# Patient Record
Sex: MALE | Race: WHITE | NOT HISPANIC OR LATINO | Employment: OTHER | URBAN - METROPOLITAN AREA
[De-identification: names, ages, dates, MRNs, and addresses within clinical notes are randomized per-mention and may not be internally consistent; named-entity substitution may affect disease eponyms.]

---

## 2017-10-06 ENCOUNTER — HOSPITAL ENCOUNTER (EMERGENCY)
Facility: MEDICAL CENTER | Age: 62
End: 2017-10-06
Attending: EMERGENCY MEDICINE
Payer: MEDICAID

## 2017-10-06 VITALS
OXYGEN SATURATION: 95 % | WEIGHT: 191.36 LBS | SYSTOLIC BLOOD PRESSURE: 135 MMHG | HEART RATE: 86 BPM | BODY MASS INDEX: 27.4 KG/M2 | HEIGHT: 70 IN | TEMPERATURE: 98.9 F | RESPIRATION RATE: 14 BRPM | DIASTOLIC BLOOD PRESSURE: 86 MMHG

## 2017-10-06 DIAGNOSIS — G89.29 CHRONIC DENTAL PAIN: ICD-10-CM

## 2017-10-06 DIAGNOSIS — K08.9 CHRONIC DENTAL PAIN: ICD-10-CM

## 2017-10-06 PROCEDURE — 99283 EMERGENCY DEPT VISIT LOW MDM: CPT

## 2017-10-06 RX ORDER — PENICILLIN V POTASSIUM 500 MG/1
500 TABLET ORAL 4 TIMES DAILY
Qty: 40 TAB | Refills: 0 | Status: SHIPPED | OUTPATIENT
Start: 2017-10-06 | End: 2017-10-16

## 2017-10-06 RX ORDER — IBUPROFEN 600 MG/1
600 TABLET ORAL EVERY 6 HOURS PRN
Qty: 30 TAB | Refills: 0 | Status: SHIPPED | OUTPATIENT
Start: 2017-10-06 | End: 2018-11-27

## 2017-10-06 ASSESSMENT — ENCOUNTER SYMPTOMS: FEVER: 0

## 2017-10-06 ASSESSMENT — PAIN SCALES - GENERAL
PAINLEVEL_OUTOF10: 10
PAINLEVEL_OUTOF10: 10

## 2017-10-06 NOTE — ED PROVIDER NOTES
"ED Provider Note  ED Provider Note          CHIEF COMPLAINT  Chief Complaint   Patient presents with   • Dental Pain     broken teeth to upper front teeth,  has dentist appt on 10/19, but having too much pain and used last of pain medication last night.       HPI  Polo Johnson is a 62 y.o. male who presents to the Emergency Department For concern of recurrent dental problems. She said he is in the process of getting all his teeth extracted in getting set for dentures but there is been some delay due to financial issues. He does have an appointment on 10/19. He said he was taking ibuprofen and some hydrocodone for the pain last night. He just is feeling increasing pain across the front of his broken teeth. Denies any fevers. The pain is constant. The pain does not radiate.    REVIEW OF SYSTEMS  Review of Systems   Constitutional: Negative for fever.   HENT: Positive for dental problem.        PAST MEDICAL HISTORY   has a past medical history of Renal carcinoma, right (CMS-HCC).    SURGICAL HISTORY   has a past surgical history that includes nephrectomy radical (Right).    SOCIAL HISTORY  Social History   Substance Use Topics   • Smoking status: Never Smoker   • Smokeless tobacco: Never Used   • Alcohol use No      History   Drug Use No       FAMILY HISTORY  No family history on file.    CURRENT MEDICATIONS  Reviewed.  See Encounter Summary.     ALLERGIES  No Known Allergies    PHYSICAL EXAM  VITAL SIGNS: /86   Pulse 86   Temp 37.2 °C (98.9 °F)   Resp 14   Ht 1.778 m (5' 10\")   Wt 86.8 kg (191 lb 5.8 oz)   SpO2 95%   BMI 27.46 kg/m²   Physical Exam   Constitutional: He is oriented to person, place, and time.   HENT:   Head: Normocephalic and atraumatic.   Poor dental hygiene, missing almost all of his top teeth with just some small broken aspects in the gingiva, no gingival erythema, no apical or periapical abscess, no facial abscess noted either.   Eyes: Pupils are equal, round, and reactive to " light.   Neck: Normal range of motion.   Pulmonary/Chest: Effort normal.   Abdominal: Soft.   Neurological: He is oriented to person, place, and time.   Skin: Skin is warm. He is not diaphoretic.   Psychiatric: He has a normal mood and affect.                   COURSE & MEDICAL DECISION MAKING  Pertinent Labs & Imaging studies reviewed. (See chart for details)    1:36 PM - Patient seen and examined at bedside.     Decision Making:  This is a 62 y.o. year old male who presents with acute on chronic dental pain. He has poor dental hygiene as well as significant dental caries. He does have an appointment coming up to continue dental extraction and get set for dentures. He is afebrile here on inspection there is no signs of gingival erythema or dental abscess. I will cover him with antibiotics until his appointment later this month and he continue taking ibuprofen or Aleve at home as needed.    FINAL IMPRESSION  1. Chronic dental pain

## 2017-10-06 NOTE — DISCHARGE INSTRUCTIONS
Chronic Pain  Chronic pain can be defined as pain that is off and on and lasts for 3-6 months or longer. Many things cause chronic pain, which can make it difficult to make a diagnosis. There are many treatment options available for chronic pain. However, finding a treatment that works well for you may require trying various approaches until the right one is found. Many people benefit from a combination of two or more types of treatment to control their pain.  SYMPTOMS   Chronic pain can occur anywhere in the body and can range from mild to very severe. Some types of chronic pain include:  · Headache.  · Low back pain.  · Cancer pain.  · Arthritis pain.  · Neurogenic pain. This is pain resulting from damage to nerves.   People with chronic pain may also have other symptoms such as:  · Depression.  · Anger.  · Insomnia.  · Anxiety.  DIAGNOSIS   Your health care provider will help diagnose your condition over time. In many cases, the initial focus will be on excluding possible conditions that could be causing the pain. Depending on your symptoms, your health care provider may order tests to diagnose your condition. Some of these tests may include:   · Blood tests.    · CT scan.    · MRI.    · X-rays.    · Ultrasounds.    · Nerve conduction studies.    You may need to see a specialist.   TREATMENT   Finding treatment that works well may take time. You may be referred to a pain specialist. He or she may prescribe medicine or therapies, such as:   · Mindful meditation or yoga.  · Shots (injections) of numbing or pain-relieving medicines into the spine or area of pain.  · Local electrical stimulation.  · Acupuncture.    · Massage therapy.    · Aroma, color, light, or sound therapy.    · Biofeedback.    · Working with a physical therapist to keep from getting stiff.    · Regular, gentle exercise.    · Cognitive or behavioral therapy.    · Group support.    Sometimes, surgery may be recommended.   HOME CARE INSTRUCTIONS    · Take all medicines as directed by your health care provider.    · Lessen stress in your life by relaxing and doing things such as listening to calming music.    · Exercise or be active as directed by your health care provider.    · Eat a healthy diet and include things such as vegetables, fruits, fish, and lean meats in your diet.    · Keep all follow-up appointments with your health care provider.    · Attend a support group with others suffering from chronic pain.  SEEK MEDICAL CARE IF:   · Your pain gets worse.    · You develop a new pain that was not there before.    · You cannot tolerate medicines given to you by your health care provider.    · You have new symptoms since your last visit with your health care provider.    SEEK IMMEDIATE MEDICAL CARE IF:   · You feel weak.    · You have decreased sensation or numbness.    · You lose control of bowel or bladder function.    · Your pain suddenly gets much worse.    · You develop shaking.  · You develop chills.  · You develop confusion.  · You develop chest pain.  · You develop shortness of breath.    MAKE SURE YOU:  · Understand these instructions.  · Will watch your condition.  · Will get help right away if you are not doing well or get worse.     This information is not intended to replace advice given to you by your health care provider. Make sure you discuss any questions you have with your health care provider.     Document Released: 09/09/2003 Document Revised: 08/20/2014 Document Reviewed: 06/13/2014  [employer] Interactive Patient Education ©2016 [employer]    Dental Care and Dentist Visits  Dental care supports good overall health. Regular dental visits can also help you avoid dental pain, bleeding, infection, and other more serious health problems in the future. It is important to keep the mouth healthy because diseases in the teeth, gums, and other oral tissues can spread to other areas of the body. Some problems, such as diabetes, heart disease,  and pre-term labor have been associated with poor oral health.   See your dentist every 6 months. If you experience emergency problems such as a toothache or broken tooth, go to the dentist right away. If you see your dentist regularly, you may catch problems early. It is easier to be treated for problems in the early stages.   WHAT TO EXPECT AT A DENTIST VISIT   Your dentist will look for many common oral health problems and recommend proper treatment. At your regular dental visit, you can expect:  · Gentle cleaning of the teeth and gums. This includes scraping and polishing. This helps to remove the sticky substance around the teeth and gums (plaque). Plaque forms in the mouth shortly after eating. Over time, plaque hardens on the teeth as tartar. If tartar is not removed regularly, it can cause problems. Cleaning also helps remove stains.  · Periodic X-rays. These pictures of the teeth and supporting bone will help your dentist assess the health of your teeth.  · Periodic fluoride treatments. Fluoride is a natural mineral shown to help strengthen teeth. Fluoride treatment involves applying a fluoride gel or varnish to the teeth. It is most commonly done in children.  · Examination of the mouth, tongue, jaws, teeth, and gums to look for any oral health problems, such as:  ¨ Cavities (dental caries). This is decay on the tooth caused by plaque, sugar, and acid in the mouth. It is best to catch a cavity when it is small.  ¨ Inflammation of the gums caused by plaque buildup (gingivitis).  ¨ Problems with the mouth or malformed or misaligned teeth.  ¨ Oral cancer or other diseases of the soft tissues or jaws.   KEEP YOUR TEETH AND GUMS HEALTHY  For healthy teeth and gums, follow these general guidelines as well as your dentist's specific advice:  · Have your teeth professionally cleaned at the dentist every 6 months.  · Brush twice daily with a fluoride toothpaste.  · Floss your teeth daily.   · Ask your dentist if  you need fluoride supplements, treatments, or fluoride toothpaste.  · Eat a healthy diet. Reduce foods and drinks with added sugar.  · Avoid smoking.  TREATMENT FOR ORAL HEALTH PROBLEMS  If you have oral health problems, treatment varies depending on the conditions present in your teeth and gums.  · Your caregiver will most likely recommend good oral hygiene at each visit.  · For cavities, gingivitis, or other oral health disease, your caregiver will perform a procedure to treat the problem. This is typically done at a separate appointment. Sometimes your caregiver will refer you to another dental specialist for specific tooth problems or for surgery.  SEEK IMMEDIATE DENTAL CARE IF:  · You have pain, bleeding, or soreness in the gum, tooth, jaw, or mouth area.  · A permanent tooth becomes loose or  from the gum socket.  · You experience a blow or injury to the mouth or jaw area.     This information is not intended to replace advice given to you by your health care provider. Make sure you discuss any questions you have with your health care provider.     Document Released: 08/29/2012 Document Revised: 03/11/2013 Document Reviewed: 08/29/2012  [employer] Interactive Patient Education ©2016 [employer]    Toothache  Toothaches are usually caused by tooth decay (cavity). However, other causes of toothache include:  · Gum disease.  · Cracked tooth.  · Cracked filling.  · Injury.  · Jaw problem (temporo mandibular joint or TMJ disorder).  · Tooth abscess.  · Root sensitivity.  · Grinding.  · Eruption problems.  Swelling and redness around a painful tooth often means you have a dental abscess.  Pain medicine and antibiotics can help reduce symptoms, but you will need to see a dentist within the next few days to have your problem properly evaluated and treated. If tooth decay is the problem, you may need a filling or root canal to save your tooth. If the problem is more severe, your tooth may need to be  pulled.  SEEK IMMEDIATE MEDICAL CARE IF:  · You cannot swallow.  · You develop severe swelling, increased redness, or increased pain in your mouth or face.  · You have a fever.  · You cannot open your mouth adequately.  Document Released: 01/25/2006 Document Revised: 03/11/2013 Document Reviewed: 03/17/2011  [employer]® Patient Information ©2014 [employer].

## 2017-10-06 NOTE — ED NOTES
Patient ambulatory from triage to Y58 w/ steady gait. Patient alert and oriented x 4. Calm and cooperative.    Patient complaining of severe dental pain related to upper front teeth dental carries/broken teeth. Patient states he has run out of pain medications and OTC NSAIDS are not effective for the pain. Patient states he has dental appointment soon, but cannot tolerate the pain. Patient denies any recent antibiotic use.

## 2017-10-06 NOTE — ED NOTES
Polo Johnson  Chief Complaint   Patient presents with   • Dental Pain     broken teeth to upper front teeth,  has dentist appt on 10/19, but having too much pain and used last of pain medication last night.     Pt ambulatory to triage with above complaint. VSS.    Pt returned to lobby, educated on triage process, and to inform staff of any changes or concerns.

## 2017-10-06 NOTE — ED NOTES
Patient dc'd to home w/ self. Patient alert and oriented x 4. Patient ambulatory w/ steady gait. Patient verbalizes understanding of dc instructions, follow up care, and prescriptions x 2. Patient denies questions upon discharge.

## 2018-11-26 ENCOUNTER — HOSPITAL ENCOUNTER (OUTPATIENT)
Facility: MEDICAL CENTER | Age: 63
End: 2018-11-27
Attending: EMERGENCY MEDICINE | Admitting: INTERNAL MEDICINE
Payer: MEDICAID

## 2018-11-26 DIAGNOSIS — R07.9 CHEST PAIN, UNSPECIFIED TYPE: ICD-10-CM

## 2018-11-26 PROCEDURE — 99285 EMERGENCY DEPT VISIT HI MDM: CPT

## 2018-11-26 PROCEDURE — 93005 ELECTROCARDIOGRAM TRACING: CPT

## 2018-11-26 PROCEDURE — 93005 ELECTROCARDIOGRAM TRACING: CPT | Performed by: EMERGENCY MEDICINE

## 2018-11-26 ASSESSMENT — PAIN SCALES - GENERAL: PAINLEVEL_OUTOF10: 0

## 2018-11-27 ENCOUNTER — APPOINTMENT (OUTPATIENT)
Dept: RADIOLOGY | Facility: MEDICAL CENTER | Age: 63
End: 2018-11-27
Attending: INTERNAL MEDICINE
Payer: MEDICAID

## 2018-11-27 ENCOUNTER — APPOINTMENT (OUTPATIENT)
Dept: RADIOLOGY | Facility: MEDICAL CENTER | Age: 63
End: 2018-11-27
Attending: EMERGENCY MEDICINE
Payer: MEDICAID

## 2018-11-27 VITALS
RESPIRATION RATE: 19 BRPM | HEART RATE: 99 BPM | SYSTOLIC BLOOD PRESSURE: 155 MMHG | OXYGEN SATURATION: 92 % | DIASTOLIC BLOOD PRESSURE: 97 MMHG | HEIGHT: 70 IN | TEMPERATURE: 97.3 F | BODY MASS INDEX: 25.53 KG/M2 | WEIGHT: 178.35 LBS

## 2018-11-27 PROBLEM — J40 BRONCHITIS: Status: RESOLVED | Noted: 2018-11-27 | Resolved: 2018-11-27

## 2018-11-27 PROBLEM — D72.829 LEUKOCYTOSIS: Status: ACTIVE | Noted: 2018-11-27

## 2018-11-27 PROBLEM — R07.9 CHEST PAIN: Status: RESOLVED | Noted: 2018-11-27 | Resolved: 2018-11-27

## 2018-11-27 PROBLEM — R07.9 CHEST PAIN: Status: ACTIVE | Noted: 2018-11-27

## 2018-11-27 PROBLEM — J40 BRONCHITIS: Status: ACTIVE | Noted: 2018-11-27

## 2018-11-27 LAB
ANION GAP SERPL CALC-SCNC: 8 MMOL/L (ref 0–11.9)
APTT PPP: 32.9 SEC (ref 24.7–36)
BASOPHILS # BLD AUTO: 0.9 % (ref 0–1.8)
BASOPHILS # BLD: 0.13 K/UL (ref 0–0.12)
BLOOD CULTURE HOLD CXBCH: NORMAL
BNP SERPL-MCNC: 4 PG/ML (ref 0–100)
BUN SERPL-MCNC: 27 MG/DL (ref 8–22)
BURR CELLS BLD QL SMEAR: NORMAL
CALCIUM SERPL-MCNC: 9.5 MG/DL (ref 8.5–10.5)
CHLORIDE SERPL-SCNC: 100 MMOL/L (ref 96–112)
CO2 SERPL-SCNC: 29 MMOL/L (ref 20–33)
CREAT SERPL-MCNC: 0.98 MG/DL (ref 0.5–1.4)
EKG IMPRESSION: NORMAL
EOSINOPHIL # BLD AUTO: 0.11 K/UL (ref 0–0.51)
EOSINOPHIL NFR BLD: 0.8 % (ref 0–6.9)
ERYTHROCYTE [DISTWIDTH] IN BLOOD BY AUTOMATED COUNT: 46.7 FL (ref 35.9–50)
EST. AVERAGE GLUCOSE BLD GHB EST-MCNC: 148 MG/DL
GIANT PLATELETS BLD QL SMEAR: NORMAL
GLUCOSE SERPL-MCNC: 112 MG/DL (ref 65–99)
HBA1C MFR BLD: 6.8 % (ref 0–5.6)
HCT VFR BLD AUTO: 51.6 % (ref 42–52)
HGB BLD-MCNC: 17.1 G/DL (ref 14–18)
INR PPP: 0.97 (ref 0.87–1.13)
LG PLATELETS BLD QL SMEAR: NORMAL
LYMPHOCYTES # BLD AUTO: 5.38 K/UL (ref 1–4.8)
LYMPHOCYTES NFR BLD: 37.6 % (ref 22–41)
MAGNESIUM SERPL-MCNC: 2 MG/DL (ref 1.5–2.5)
MANUAL DIFF BLD: NORMAL
MCH RBC QN AUTO: 29.4 PG (ref 27–33)
MCHC RBC AUTO-ENTMCNC: 33.1 G/DL (ref 33.7–35.3)
MCV RBC AUTO: 88.8 FL (ref 81.4–97.8)
METAMYELOCYTES NFR BLD MANUAL: 0.9 %
MONOCYTES # BLD AUTO: 1.1 K/UL (ref 0–0.85)
MONOCYTES NFR BLD AUTO: 7.7 % (ref 0–13.4)
MORPHOLOGY BLD-IMP: NORMAL
NEUTROPHILS # BLD AUTO: 7.45 K/UL (ref 1.82–7.42)
NEUTROPHILS NFR BLD: 52.1 % (ref 44–72)
NRBC # BLD AUTO: 0 K/UL
NRBC BLD-RTO: 0 /100 WBC
OVALOCYTES BLD QL SMEAR: NORMAL
PLATELET # BLD AUTO: 367 K/UL (ref 164–446)
PLATELET BLD QL SMEAR: NORMAL
PMV BLD AUTO: 10.2 FL (ref 9–12.9)
POIKILOCYTOSIS BLD QL SMEAR: NORMAL
POTASSIUM SERPL-SCNC: 4.3 MMOL/L (ref 3.6–5.5)
PROTHROMBIN TIME: 13 SEC (ref 12–14.6)
RBC # BLD AUTO: 5.81 M/UL (ref 4.7–6.1)
RBC BLD AUTO: PRESENT
SODIUM SERPL-SCNC: 137 MMOL/L (ref 135–145)
TROPONIN I SERPL-MCNC: <0.01 NG/ML (ref 0–0.04)
TSH SERPL DL<=0.005 MIU/L-ACNC: 1.14 UIU/ML (ref 0.38–5.33)
WBC # BLD AUTO: 14.3 K/UL (ref 4.8–10.8)

## 2018-11-27 PROCEDURE — A9270 NON-COVERED ITEM OR SERVICE: HCPCS | Performed by: EMERGENCY MEDICINE

## 2018-11-27 PROCEDURE — A9502 TC99M TETROFOSMIN: HCPCS

## 2018-11-27 PROCEDURE — 83036 HEMOGLOBIN GLYCOSYLATED A1C: CPT

## 2018-11-27 PROCEDURE — 94760 N-INVAS EAR/PLS OXIMETRY 1: CPT

## 2018-11-27 PROCEDURE — 85027 COMPLETE CBC AUTOMATED: CPT

## 2018-11-27 PROCEDURE — 85610 PROTHROMBIN TIME: CPT

## 2018-11-27 PROCEDURE — 71045 X-RAY EXAM CHEST 1 VIEW: CPT

## 2018-11-27 PROCEDURE — 85730 THROMBOPLASTIN TIME PARTIAL: CPT

## 2018-11-27 PROCEDURE — G0378 HOSPITAL OBSERVATION PER HR: HCPCS

## 2018-11-27 PROCEDURE — 83735 ASSAY OF MAGNESIUM: CPT

## 2018-11-27 PROCEDURE — 700101 HCHG RX REV CODE 250: Performed by: INTERNAL MEDICINE

## 2018-11-27 PROCEDURE — 84443 ASSAY THYROID STIM HORMONE: CPT

## 2018-11-27 PROCEDURE — 84484 ASSAY OF TROPONIN QUANT: CPT

## 2018-11-27 PROCEDURE — 36415 COLL VENOUS BLD VENIPUNCTURE: CPT

## 2018-11-27 PROCEDURE — 700111 HCHG RX REV CODE 636 W/ 250 OVERRIDE (IP): Performed by: INTERNAL MEDICINE

## 2018-11-27 PROCEDURE — 85007 BL SMEAR W/DIFF WBC COUNT: CPT

## 2018-11-27 PROCEDURE — 99236 HOSP IP/OBS SAME DATE HI 85: CPT | Performed by: INTERNAL MEDICINE

## 2018-11-27 PROCEDURE — 80048 BASIC METABOLIC PNL TOTAL CA: CPT

## 2018-11-27 PROCEDURE — 700102 HCHG RX REV CODE 250 W/ 637 OVERRIDE(OP): Performed by: EMERGENCY MEDICINE

## 2018-11-27 PROCEDURE — 700111 HCHG RX REV CODE 636 W/ 250 OVERRIDE (IP)

## 2018-11-27 PROCEDURE — 93005 ELECTROCARDIOGRAM TRACING: CPT | Performed by: INTERNAL MEDICINE

## 2018-11-27 PROCEDURE — 83880 ASSAY OF NATRIURETIC PEPTIDE: CPT

## 2018-11-27 PROCEDURE — 94640 AIRWAY INHALATION TREATMENT: CPT

## 2018-11-27 PROCEDURE — 96374 THER/PROPH/DIAG INJ IV PUSH: CPT | Mod: XU

## 2018-11-27 RX ORDER — REGADENOSON 0.08 MG/ML
INJECTION, SOLUTION INTRAVENOUS
Status: COMPLETED
Start: 2018-11-27 | End: 2018-11-27

## 2018-11-27 RX ORDER — AMOXICILLIN 250 MG
2 CAPSULE ORAL 2 TIMES DAILY
Status: DISCONTINUED | OUTPATIENT
Start: 2018-11-27 | End: 2018-11-27 | Stop reason: HOSPADM

## 2018-11-27 RX ORDER — ASPIRIN 81 MG/1
324 TABLET, CHEWABLE ORAL ONCE
Status: COMPLETED | OUTPATIENT
Start: 2018-11-27 | End: 2018-11-27

## 2018-11-27 RX ORDER — AMOXICILLIN 500 MG/1
500 CAPSULE ORAL EVERY 12 HOURS
COMMUNITY
Start: 2018-11-26 | End: 2018-11-27

## 2018-11-27 RX ORDER — BISACODYL 10 MG
10 SUPPOSITORY, RECTAL RECTAL
Status: DISCONTINUED | OUTPATIENT
Start: 2018-11-27 | End: 2018-11-27 | Stop reason: HOSPADM

## 2018-11-27 RX ORDER — LORAZEPAM 2 MG/ML
0.5 INJECTION INTRAMUSCULAR ONCE
Status: COMPLETED | OUTPATIENT
Start: 2018-11-27 | End: 2018-11-27

## 2018-11-27 RX ORDER — METHYLPREDNISOLONE 4 MG/1
4-16 TABLET ORAL DAILY
COMMUNITY
Start: 2018-11-19 | End: 2018-11-27

## 2018-11-27 RX ORDER — ONDANSETRON 4 MG/1
4 TABLET, ORALLY DISINTEGRATING ORAL EVERY 4 HOURS PRN
Status: DISCONTINUED | OUTPATIENT
Start: 2018-11-27 | End: 2018-11-27 | Stop reason: HOSPADM

## 2018-11-27 RX ORDER — ACETAMINOPHEN 325 MG/1
650 TABLET ORAL EVERY 6 HOURS PRN
Status: DISCONTINUED | OUTPATIENT
Start: 2018-11-27 | End: 2018-11-27 | Stop reason: HOSPADM

## 2018-11-27 RX ORDER — TRIAMCINOLONE ACETONIDE 55 UG/1
2 SPRAY, METERED NASAL DAILY
Status: SHIPPED | COMMUNITY
End: 2021-12-23

## 2018-11-27 RX ORDER — ASPIRIN 300 MG/1
300 SUPPOSITORY RECTAL ONCE
Status: DISCONTINUED | OUTPATIENT
Start: 2018-11-27 | End: 2018-11-27

## 2018-11-27 RX ORDER — ONDANSETRON 2 MG/ML
4 INJECTION INTRAMUSCULAR; INTRAVENOUS EVERY 4 HOURS PRN
Status: DISCONTINUED | OUTPATIENT
Start: 2018-11-27 | End: 2018-11-27 | Stop reason: HOSPADM

## 2018-11-27 RX ORDER — POLYETHYLENE GLYCOL 3350 17 G/17G
1 POWDER, FOR SOLUTION ORAL
Status: DISCONTINUED | OUTPATIENT
Start: 2018-11-27 | End: 2018-11-27 | Stop reason: HOSPADM

## 2018-11-27 RX ORDER — ASPIRIN 325 MG
325 TABLET ORAL ONCE
Status: DISCONTINUED | OUTPATIENT
Start: 2018-11-27 | End: 2018-11-27

## 2018-11-27 RX ORDER — ASPIRIN 81 MG/1
324 TABLET, CHEWABLE ORAL ONCE
Status: DISCONTINUED | OUTPATIENT
Start: 2018-11-27 | End: 2018-11-27

## 2018-11-27 RX ORDER — OXYMETAZOLINE HYDROCHLORIDE 0.05 G/100ML
2 SPRAY NASAL 2 TIMES DAILY
Status: SHIPPED | COMMUNITY
End: 2021-12-23

## 2018-11-27 RX ADMIN — ALBUTEROL SULFATE 2.5 MG: 2.5 SOLUTION RESPIRATORY (INHALATION) at 05:42

## 2018-11-27 RX ADMIN — LORAZEPAM 0.5 MG: 2 INJECTION INTRAMUSCULAR; INTRAVENOUS at 07:49

## 2018-11-27 RX ADMIN — ASPIRIN 324 MG: 81 TABLET, CHEWABLE ORAL at 00:19

## 2018-11-27 RX ADMIN — REGADENOSON 0.4 MG: 0.08 INJECTION, SOLUTION INTRAVENOUS at 12:21

## 2018-11-27 ASSESSMENT — ENCOUNTER SYMPTOMS
WHEEZING: 0
BLOOD IN STOOL: 0
SORE THROAT: 0
FLANK PAIN: 0
NAUSEA: 0
HEADACHES: 0
DIAPHORESIS: 0
SHORTNESS OF BREATH: 1
ABDOMINAL PAIN: 0
MYALGIAS: 0
PALPITATIONS: 0
SEIZURES: 0
VOMITING: 0
NECK PAIN: 0
FOCAL WEAKNESS: 0
CHILLS: 0
BACK PAIN: 0
FEVER: 0
SPUTUM PRODUCTION: 0
DIZZINESS: 0
BRUISES/BLEEDS EASILY: 0
DIARRHEA: 0
BLURRED VISION: 0
COUGH: 1

## 2018-11-27 ASSESSMENT — PAIN SCALES - GENERAL
PAINLEVEL_OUTOF10: 0
PAINLEVEL_OUTOF10: 2

## 2018-11-27 ASSESSMENT — LIFESTYLE VARIABLES
DO YOU DRINK ALCOHOL: NO
EVER_SMOKED: NEVER

## 2018-11-27 ASSESSMENT — COPD QUESTIONNAIRES
DO YOU EVER COUGH UP ANY MUCUS OR PHLEGM?: YES, A FEW DAYS A WEEK OR MONTH
HAVE YOU SMOKED AT LEAST 100 CIGARETTES IN YOUR ENTIRE LIFE: NO/DON'T KNOW
DURING THE PAST 4 WEEKS HOW MUCH DID YOU FEEL SHORT OF BREATH: NONE/LITTLE OF THE TIME
COPD SCREENING SCORE: 4

## 2018-11-27 NOTE — PROGRESS NOTES
Med rec complete per [facility]  Allergies reviewed    Patient started a 7 day course of Amoxicillin 11-19-18

## 2018-11-27 NOTE — ED TRIAGE NOTES
Chief Complaint   Patient presents with   • Medication Reaction     taking methylpred 4mg zenobia x6 days, gradual onset of SOB and CP on day 5 of new medication.   • Chest Pain     0200, sudden onset of chest pain   • N/V     Patient reports recent diagnosis of bacterial infection, taking methylprednisolone and amoxicillian as prescribed.  Patient belives he is having a medication reaction day 5 of taking medication.      Patient reports chest pain as left sided non-radiating pain.  Denies cardiac history.    EKG complete.  Explained wait time and triage process to pt. Pt placed back out in lobby, told to notify ED tech or triage RN of any changes, verbalized understanding.

## 2018-11-27 NOTE — PROGRESS NOTES
Attending Hospitalist today is Dr. Villarreal.  Please call this physician for orders, updates, or questions.

## 2018-11-27 NOTE — ED NOTES
Spoke with Nuclear Medicine - state that patient is scheduled for his stress test at 1130 this morning.

## 2018-11-27 NOTE — ED PROVIDER NOTES
ED Provider Note    ED Provider Note      Primary care provider: Pcp Pt States None    CHIEF COMPLAINT  Chief Complaint   Patient presents with   • Medication Reaction     taking methylpred 4mg zenobia x6 days, gradual onset of SOB and CP on day 5 of new medication.   • Chest Pain     0200, sudden onset of chest pain   • N/V       HPI  Polo Johnson is a 63 y.o. male who presents to the Emergency Department with chief complaint of chest pain shortness of breath.  Patient reports that was seen by his primary care physician 6 days prior and was prescribed amoxicillin and Medrol Dosepak.  States that yesterday evening began having tightness throughout his chest and shortness of breath.  The chest pain is worse with exertion is worse lying flat.  He reports some itching on his face as well.  There is been no mucosal or throat swelling he no skin changes.  He also reports pain that he describes as tightness throughout his chest.  Denies any headache any diaphoresis he has had mild nausea without emesis.  He has had progressive cough over the last few days as well which is nonproductive.  No abdominal pain across patient diarrhea dysuria he no other symptoms or concerns.  No previous history of cardiac disease.    REVIEW OF SYSTEMS  10 systems reviewed and otherwise negative, pertinent positives and negatives listed in the history of present illness.    PAST MEDICAL HISTORY   has a past medical history of Renal carcinoma, right (HCC); Seasonal allergies; and Sinus infection.    SURGICAL HISTORY   has a past surgical history that includes nephrectomy radical (Right).    SOCIAL HISTORY  Social History   Substance Use Topics   • Smoking status: Never Smoker   • Smokeless tobacco: Never Used   • Alcohol use No      History   Drug Use No       FAMILY HISTORY  Non-Contributory    CURRENT MEDICATIONS  Home Medications     Reviewed by Yumi Carvajal R.N. (Registered Nurse) on 11/26/18 at 2235  Med List Status: Partial  "  Medication Last Dose Status   amoxicillin (AMOXIL) 250 MG Chew Tab 11/26/2018 Active   cyclobenzaprine (FLEXERIL) 10 MG TABS Not Taking Active   ibuprofen (MOTRIN) 600 MG Tab  Active   mometasone (NASONEX) 50 MCG/ACT nasal spray  Active   predniSONE (DELTASONE) 10 MG TABS 11/26/2018 Active                ALLERGIES  No Known Allergies    PHYSICAL EXAM  VITAL SIGNS: /97   Pulse 91   Temp 36.3 °C (97.3 °F) (Temporal)   Resp 19   Ht 1.778 m (5' 10\")   Wt 80.9 kg (178 lb 5.6 oz)   SpO2 90%   BMI 25.59 kg/m²    Pulse ox interpretation: I interpret this pulse ox as normal.  Constitutional: Alert and oriented x 3, minimal distress  HEENT: Atraumatic normocephalic, pupils are equal round reactive to light extraocular movements are intact. The nares is clear, external ears are normal, mouth shows moist mucous membranes  Neck: Supple, no JVD no tracheal deviation  Cardiovascular: Regular rate and rhythm no murmur rub or gallop 2+ pulses peripherally x4  Thorax & Lungs: No respiratory distress, no wheezes rales or rhonchi, No chest tenderness.   GI: Soft nontender nondistended positive bowel sounds, no peritoneal signs  Skin: Warm dry no acute rash or lesion  Musculoskeletal: Moving all extremities with full range and 5 of 5 strength, no acute  deformity  Neurologic: Cranial nerves III through XII are grossly intact, no sensory deficit, no cerebellar dysfunction   Psychiatric: Appropriate affect for situation at this time      DIAGNOSTIC STUDIES / PROCEDURES  LABS    Results for orders placed or performed during the hospital encounter of 11/26/18   CBC w/ Differential   Result Value Ref Range    WBC 14.3 (H) 4.8 - 10.8 K/uL    RBC 5.81 4.70 - 6.10 M/uL    Hemoglobin 17.1 14.0 - 18.0 g/dL    Hematocrit 51.6 42.0 - 52.0 %    MCV 88.8 81.4 - 97.8 fL    MCH 29.4 27.0 - 33.0 pg    MCHC 33.1 (L) 33.7 - 35.3 g/dL    RDW 46.7 35.9 - 50.0 fL    Platelet Count 367 164 - 446 K/uL    MPV 10.2 9.0 - 12.9 fL    Nucleated RBC " 0.00 /100 WBC    NRBC (Absolute) 0.00 K/uL    Neutrophils-Polys 52.10 44.00 - 72.00 %    Lymphocytes 37.60 22.00 - 41.00 %    Monocytes 7.70 0.00 - 13.40 %    Eosinophils 0.80 0.00 - 6.90 %    Basophils 0.90 0.00 - 1.80 %    Neutrophils (Absolute) 7.45 (H) 1.82 - 7.42 K/uL    Lymphs (Absolute) 5.38 (H) 1.00 - 4.80 K/uL    Monos (Absolute) 1.10 (H) 0.00 - 0.85 K/uL    Eos (Absolute) 0.11 0.00 - 0.51 K/uL    Baso (Absolute) 0.13 (H) 0.00 - 0.12 K/uL   Basic Metabolic Panel (BMP)   Result Value Ref Range    Sodium 137 135 - 145 mmol/L    Potassium 4.3 3.6 - 5.5 mmol/L    Chloride 100 96 - 112 mmol/L    Co2 29 20 - 33 mmol/L    Glucose 112 (H) 65 - 99 mg/dL    Bun 27 (H) 8 - 22 mg/dL    Creatinine 0.98 0.50 - 1.40 mg/dL    Calcium 9.5 8.5 - 10.5 mg/dL    Anion Gap 8.0 0.0 - 11.9   Troponin STAT   Result Value Ref Range    Troponin I <0.01 0.00 - 0.04 ng/mL   Btype Natriuretic Peptide   Result Value Ref Range    B Natriuretic Peptide 4 0 - 100 pg/mL   Magnesium   Result Value Ref Range    Magnesium 2.0 1.5 - 2.5 mg/dL   PT/INR   Result Value Ref Range    PT 13.0 12.0 - 14.6 sec    INR 0.97 0.87 - 1.13   APTT   Result Value Ref Range    APTT 32.9 24.7 - 36.0 sec   ESTIMATED GFR   Result Value Ref Range    GFR If African American >60 >60 mL/min/1.73 m 2    GFR If Non African American >60 >60 mL/min/1.73 m 2   BLOOD CULTURE,HOLD   Result Value Ref Range    Blood Culture Hold Collected    DIFFERENTIAL MANUAL   Result Value Ref Range    Metamyelocytes 0.90 %    Manual Diff Status PERFORMED    PERIPHERAL SMEAR REVIEW   Result Value Ref Range    Peripheral Smear Review see below    PLATELET ESTIMATE   Result Value Ref Range    Plt Estimation Normal    MORPHOLOGY   Result Value Ref Range    RBC Morphology Present     Large Platelets 1+     Giant Platelets 1+     Poikilocytosis 1+     Ovalocytes 1+     Echinocytes 1+    EKG (NOW)   Result Value Ref Range    Report       [facility] Emergency Dept.    Test Date:   2018  Pt Name:    HILLARY TORRES               Department: ER  MRN:        [id number]                      Room:  Gender:     Male                         Technician: [id number]  :        1955                   Requested By:ER TRIAGE PROTOCOL  Order #:    [id number]                    Reading MD: BUCK CHRISTOPHER MD    Measurements  Intervals                                Axis  Rate:       84                           P:          25  WV:         168                          QRS:        59  QRSD:       86                           T:          43  QT:         372  QTc:        440    Interpretive Statements  normal sinus rhythm at  84   , no ST elevation no ST depression no T-wave  inversion appropriate R-wave progression normal axis normal intervals no  other  ischemic or rhythmic features  Electronically Signed On 2018 0:34:44 PST by BUCK CHRISTOPHER MD         All labs reviewed by me.      RADIOLOGY  DX-CHEST-PORTABLE (1 VIEW)   Final Result         1.  No acute cardiopulmonary disease.      NM-CARDIAC STRESS TEST    (Results Pending)     The radiologist's interpretation of all radiological studies have been reviewed by me.    COURSE & MEDICAL DECISION MAKING  Pertinent Labs & Imaging studies reviewed. (See chart for details)    12:32 AM - Patient seen and examined at bedside.  Patient attributes symptoms to possible allergic reaction however very non-characteristic is age this is much more concerning for possible cardiac or pulmonary etiology.  Patient will have cardiac protocol labs and imaging.  His EKG is nonischemic at this point in his pain is okay when he is sitting up and at rest right now.  Patient given 325 mg of chewable aspirin and will be closely observed for the duration of this evaluation.      Results as above reassuring at this point patient's had no further chest pain since presentation.  His presenting complaints however are concerning for ACS patient will be admitted to the  "hospital for further evaluation treatment I have discussed this Dr. Walker his help with this patient is greatly appreciated.      Patient noted to have slightly elevated blood pressure likely circumstantial secondary to presenting complaint. Referred to primary care physician for further evaluation.      /97   Pulse 80   Temp 36.3 °C (97.3 °F) (Temporal)   Resp 19   Ht 1.778 m (5' 10\")   Wt 80.9 kg (178 lb 5.6 oz)   SpO2 91%   BMI 25.59 kg/m²              FINAL IMPRESSION  1.  Chest pain  2.  Shortness of breath      This dictation has been created using voice recognition software and/or scribes. The accuracy of the dictation is limited by the abilities of the software and the expertise of the scribes. I expect there may be some errors of grammar and possibly content. I made every attempt to manually correct the errors within my dictation. However, errors related to voice recognition software and/or scribes may still exist and should be interpreted within the appropriate context.            "

## 2018-11-27 NOTE — ED NOTES
"IV access placed and blood drawn per orders and sent to lab. Patient tolerated well with no complaints of pain/disomfort.    Patient states that he got all of his teeth pulled out 4 months ago. States that he has been healthy since then, and that all of his checkups have been fine. Recently he went to his PCP for a cold: productive cough, chills/night sweats at home, sinus pressure. States that he has been having chest discomfort since. Worse with laying flat and coughing. Improves with heating pad.    Patient currently rates midsternal chest pain as 2-3/10, deribes pain as a \"pulling squeezing pressure\". Denies nausea, dizziness, tingling, numbness, headache, blurred vision. Complains of slight lightheadedness.    Medicated with Aspirin per orders.    States that he called the nurse hotline who told him to present to the ED for evaluation.  "

## 2018-11-27 NOTE — DISCHARGE INSTRUCTIONS
Discharge Instructions    Discharged to home by car with relative. Discharged via wheelchair, hospital escort: Yes.  Special equipment needed: Not Applicable    Be sure to schedule a follow-up appointment with your primary care doctor or any specialists as instructed.     Discharge Plan:   Diet Plan: Discussed  Activity Level: Discussed  Confirmed Follow up Appointment: Patient to Call and Schedule Appointment  Confirmed Symptoms Management: Discussed  Medication Reconciliation Updated: Yes    I understand that a diet low in cholesterol, fat, and sodium is recommended for good health. Unless I have been given specific instructions below for another diet, I accept this instruction as my diet prescription.   Other diet: Heart healthy    Special Instructions: None    · Is patient discharged on Warfarin / Coumadin?   No     Chest Pain Observation  It is often hard to give a specific diagnosis for the cause of chest pain. Among other possibilities your symptoms might be caused by inadequate oxygen delivery to your heart (angina). Angina that is not treated or evaluated can lead to a heart attack (myocardial infarction) or death.  Blood tests, electrocardiograms, and X-rays may have been done to help determine a possible cause of your chest pain. After evaluation and observation, your health care provider has determined that it is unlikely your pain was caused by an unstable condition that requires hospitalization. However, a full evaluation of your pain may need to be completed, with additional diagnostic testing as directed. It is very important to keep your follow-up appointments. Not keeping your follow-up appointments could result in permanent heart damage, disability, or death. If there is any problem keeping your follow-up appointments, you must call your health care provider.  HOME CARE INSTRUCTIONS   Due to the slight chance that your pain could be angina, it is important to follow your health care provider's  treatment plan and also maintain a healthy lifestyle:  · Maintain or work toward achieving a healthy weight.  · Stay physically active and exercise regularly.  · Decrease your salt intake.  · Eat a balanced, healthy diet. Talk to a dietitian to learn about heart-healthy foods.  · Increase your fiber intake by including whole grains, vegetables, fruits, and nuts in your diet.  · Avoid situations that cause stress, anger, or depression.  · Take medicines as advised by your health care provider. Report any side effects to your health care provider. Do not stop medicines or adjust the dosages on your own.  · Quit smoking. Do not use nicotine patches or gum until you check with your health care provider.  · Keep your blood pressure, blood sugar, and cholesterol levels within normal limits.  · Limit alcohol intake to no more than 1 drink per day for women who are not pregnant and 2 drinks per day for men.  · Do not abuse drugs.  SEEK IMMEDIATE MEDICAL CARE IF:  You have severe chest pain or pressure which may include symptoms such as:  · You feel pain or pressure in your arms, neck, jaw, or back.  · You have severe back or abdominal pain, feel sick to your stomach (nauseous), or throw up (vomit).  · You are sweating profusely.  · You are having a fast or irregular heartbeat.  · You feel short of breath while at rest.  · You notice increasing shortness of breath during rest, sleep, or with activity.  · You have chest pain that does not get better after rest or after taking your usual medicine.  · You wake from sleep with chest pain.  · You are unable to sleep because you cannot breathe.  · You develop a frequent cough or you are coughing up blood.  · You feel dizzy, faint, or experience extreme fatigue.  · You develop severe weakness, dizziness, fainting, or chills.  Any of these symptoms may represent a serious problem that is an emergency. Do not wait to see if the symptoms will go away. Call your local emergency  services (911 in the [place] Do not drive yourself to the hospital.  MAKE SURE YOU:  · Understand these instructions.  · Will watch your condition.  · Will get help right away if you are not doing well or get worse.     This information is not intended to replace advice given to you by your health care provider. Make sure you discuss any questions you have with your health care provider.     Document Released: 01/20/2012 Document Revised: 12/23/2014 Document Reviewed: 06/19/2014  [employer] Interactive Patient Education ©2016 [employer]    Depression / Suicide Risk    As you are discharged from this [facility] facility, it is important to learn how to keep safe from harming yourself.    Recognize the warning signs:  · Abrupt changes in personality, positive or negative- including increase in energy   · Giving away possessions  · Change in eating patterns- significant weight changes-  positive or negative  · Change in sleeping patterns- unable to sleep or sleeping all the time   · Unwillingness or inability to communicate  · Depression  · Unusual sadness, discouragement and loneliness  · Talk of wanting to die  · Neglect of personal appearance   · Rebelliousness- reckless behavior  · Withdrawal from people/activities they love  · Confusion- inability to concentrate     If you or a loved one observes any of these behaviors or has concerns about self-harm, here's what you can do:  · Talk about it- your feelings and reasons for harming yourself  · Remove any means that you might use to hurt yourself (examples: pills, rope, extension cords, firearm)  · Get professional help from the community (Mental Health, Substance Abuse, psychological counseling)  · Do not be alone:Call your Safe Contact- someone whom you trust who will be there for you.  · Call your local CRISIS HOTLINE 818-1223 or 397-727-7115  · Call your local Children's Mobile Crisis Response Team [place] (077) 330-3328 or www.Appsperse  · Call  the toll free National Suicide Prevention Hotlines   · National Suicide Prevention Lifeline 222-085-QKDA (3374)  · National Hope Line Network 800-SUICIDE (281-4359)

## 2018-11-27 NOTE — H&P
Hospital Medicine History & Physical Note    Date of Service  11/27/2018    Primary Care Physician  Pcp Pt States None    Consultants  None    Code Status  Full code    Chief Complaint  Chest pain    History of Presenting Illness  63 y.o. male who presented 11/26/2018 with chest pain and shortness of breath started yesterday.  Patient states that he was prescribed amoxicillin and a Medrol Dosepak by his PCP for possible bronchitis 6 days ago.  Yesterday evening he woke up with substernal chest pain rated at 7/10 intensity with no radiation associated with shortness of breath and chest tightness.  He felt his pain worsened with lying flat.  He also reported some itching on his face at this time but denied any rashes or swelling.  He denies any fevers, chills or lightheadedness.  He reports some nausea but no vomiting.  His cough has improved and is currently nonproductive.  He denies any history of tobacco use.  His father has a history of MI with stent placement.    EKG interpreted by me reveals sinus rhythm with no acute ischemic changes  Chest x-ray interpreted by me reveals no acute cardiopulmonary process    Review of Systems  Review of Systems   Constitutional: Negative for chills, diaphoresis and fever.   HENT: Negative for hearing loss and sore throat.    Eyes: Negative for blurred vision.   Respiratory: Positive for cough and shortness of breath. Negative for sputum production and wheezing.    Cardiovascular: Positive for chest pain. Negative for palpitations and leg swelling.   Gastrointestinal: Negative for abdominal pain, blood in stool, diarrhea, nausea and vomiting.   Genitourinary: Negative for dysuria, flank pain and urgency.   Musculoskeletal: Negative for back pain, joint pain, myalgias and neck pain.   Skin: Negative for rash.   Neurological: Negative for dizziness, focal weakness, seizures and headaches.   Endo/Heme/Allergies: Does not bruise/bleed easily.   Psychiatric/Behavioral: Negative for  suicidal ideas.   All other systems reviewed and are negative.      Past Medical History   has a past medical history of Renal carcinoma, right (HCC); Seasonal allergies; and Sinus infection.    Surgical History   has a past surgical history that includes nephrectomy radical (Right).     Family History  Father had MI at age 70 with stent placement    Social History   reports that he has never smoked. He has never used smokeless tobacco. He reports that he does not drink alcohol or use drugs.    Allergies  No Known Allergies    Medications  Prior to Admission Medications   Prescriptions Last Dose Informant Patient Reported? Taking?   amoxicillin (AMOXIL) 250 MG Chew Tab 11/26/2018 at Unknown time  Yes Yes   Sig: Take  by mouth 3 times a day.   cyclobenzaprine (FLEXERIL) 10 MG TABS Not Taking  No No   Sig: Take 1 Tab by mouth 3 times a day as needed for Mild Pain or Muscle Spasms.   ibuprofen (MOTRIN) 600 MG Tab   No No   Sig: Take 1 Tab by mouth every 6 hours as needed.   mometasone (NASONEX) 50 MCG/ACT nasal spray   No No   Sig: Spray 1 Spray in nose 2 times a day. Each nostril  Indications: Perennial Rhinitis   predniSONE (DELTASONE) 10 MG TABS 11/26/2018 at Unknown time  No No   Sig: Start with 40mg today (4tabs), then taper as directed.      Facility-Administered Medications: None       Physical Exam  Temp:  [36.3 °C (97.3 °F)] 36.3 °C (97.3 °F)  Pulse:  [72-91] 73  Resp:  [19] 19  BP: (155)/(97) 155/97    Physical Exam   Constitutional: He is oriented to person, place, and time. He appears well-developed and well-nourished. No distress.   HENT:   Head: Normocephalic and atraumatic.   Mouth/Throat: Oropharynx is clear and moist.   Eyes: Pupils are equal, round, and reactive to light. Conjunctivae are normal. No scleral icterus.   Neck: Normal range of motion. Neck supple.   Cardiovascular: Normal rate, regular rhythm and normal heart sounds.    Pulmonary/Chest: Effort normal and breath sounds normal. No  respiratory distress. He has no wheezes. He has no rales.   Abdominal: Soft. Bowel sounds are normal. He exhibits no distension. There is no tenderness. There is no rebound.   Musculoskeletal: Normal range of motion. He exhibits no edema or tenderness.   Lymphadenopathy:     He has no cervical adenopathy.   Neurological: He is alert and oriented to person, place, and time. No cranial nerve deficit. Coordination normal.   Skin: Skin is warm. No rash noted.   Psychiatric: He has a normal mood and affect. His behavior is normal.   Nursing note and vitals reviewed.      Laboratory:  Recent Labs      11/27/18 0030   WBC  14.3*   RBC  5.81   HEMOGLOBIN  17.1   HEMATOCRIT  51.6   MCV  88.8   MCH  29.4   MCHC  33.1*   RDW  46.7   PLATELETCT  367   MPV  10.2     Recent Labs      11/27/18 0030   SODIUM  137   POTASSIUM  4.3   CHLORIDE  100   CO2  29   GLUCOSE  112*   BUN  27*   CREATININE  0.98   CALCIUM  9.5     Recent Labs      11/27/18 0030   GLUCOSE  112*     Recent Labs      11/27/18 0030   APTT  32.9   INR  0.97     Recent Labs      11/27/18 0030   BNPBTYPENAT  4         Recent Labs      11/27/18 0030   TROPONINI  <0.01       Urinalysis:    No results found     Imaging:  DX-CHEST-PORTABLE (1 VIEW)   Final Result         1.  No acute cardiopulmonary disease.      NM-CARDIAC STRESS TEST    (Results Pending)         Assessment/Plan:  I anticipate this patient is appropriate for observation status at this time.    Chest pain- (present on admission)   Assessment & Plan    Rule out ACS  Continuous cardiac monitoring with serial EKG and troponin  Nuclear medicine cardiac stress test in the morning if troponin remains negative  Patient has been given full dose of aspirin  Check lipid panel, TSH and hemoglobin A1c  Nitro when necessary for chest pain       Leukocytosis- (present on admission)   Assessment & Plan    Likely reactive secondary to steroids  No evidence of infection at this time  Monitor CBC and vitals      Bronchitis- (present on admission)   Assessment & Plan    Patient's symptoms of productive cough and wheezing have improved  No pneumonia on imaging  Albuterol as needed         VTE prophylaxis: SCD

## 2018-11-27 NOTE — ED NOTES
Patient resting comfortably on gurney. No acute distress. Denies needs at this time. Call bell within reach. Updated regarding plan of care.

## 2018-11-27 NOTE — ASSESSMENT & PLAN NOTE
Likely reactive secondary to steroids  No evidence of infection at this time  Monitor CBC and vitals

## 2018-11-27 NOTE — ED NOTES
Break RN: Pt given phone call to call family. Patient's concerns addressed.  Fall precautions in place.  Pt repositioned and comfortable.  Call light within reach. Will continue to monitor.

## 2018-11-27 NOTE — ED NOTES
"Pt states he is feeling anxious and is \"just going go go home.\" Pt is tachycardic and diaphoretic. Educated pt. MD paged.   "

## 2018-11-27 NOTE — ASSESSMENT & PLAN NOTE
Patient's symptoms of productive cough and wheezing have improved  No pneumonia on imaging  Albuterol as needed

## 2018-11-27 NOTE — ED NOTES
Repeat blood drawn per orders. Water given to patient - OK as per Dr. Domínguez. Repositioned in bed for comfort. Denies further needs. Call bell within reach. Updated regarding plan of care.

## 2018-11-27 NOTE — ED NOTES
Pt transferred to Y55. Pt resting in bed, NAD. Stress test scheduled for this afternoon, updated on POC

## 2018-11-27 NOTE — ASSESSMENT & PLAN NOTE
Rule out ACS  Continuous cardiac monitoring with serial EKG and troponin  Nuclear medicine cardiac stress test in the morning if troponin remains negative  Patient has been given full dose of aspirin  Check lipid panel, TSH and hemoglobin A1c  Nitro when necessary for chest pain

## 2018-11-27 NOTE — ED NOTES
Patient ambulatory from [facility] to [facility] with steady gait accompanied by ED Tech. Chart up for ERP.

## 2018-11-27 NOTE — ED NOTES
Pt dc'd home. IV and monitor removed; monitor room notified. Pt left unit with staff RN. Personal belongings with pt when leaving unit. Pt given discharge instructions prior to leaving unit including prescription and when to visit with physician; verbalizes understanding. Copy of discharge instructions with pt and in the chart.

## 2018-11-28 ENCOUNTER — PATIENT OUTREACH (OUTPATIENT)
Dept: HEALTH INFORMATION MANAGEMENT | Facility: OTHER | Age: 63
End: 2018-11-28

## 2018-11-28 NOTE — DISCHARGE SUMMARY
Discharge Summary    CHIEF COMPLAINT ON ADMISSION  Chief Complaint   Patient presents with   • Medication Reaction     taking methylpred 4mg zenobia x6 days, gradual onset of SOB and CP on day 5 of new medication.   • Chest Pain     0200, sudden onset of chest pain   • N/V       Reason for Admission  Shortness of breath;Chest Pain    Admission Date  11/26/2018    CODE STATUS  Prior    HPI & HOSPITAL COURSE    This is a 63 y.o. male here presented to the hospital on November 26, 2018 with complaint of chest pain and shortness of breath that was started 1 day prior to presentation.  He expressed on presentation that he was prescribed amoxicillin and Medrol dosepack by his PCP for possible bronchitis 6 days ago and he completed his medications.  He was evaluated by EKG and troponin which did not show any significant acute abnormalities.  He underwent nuclear medicine stress test which did not show any reversible ischemia.  I evaluated him at the bedside this morning and he denies any acute chest pain or shortness of breath.  He expressed that his symptoms has subsided and he feels ready to be discharged.  He found to have hemoglobin A1c of 6.8.  TSH are within normal limits.  I called the  to schedule a follow-up appointment with PCP.  He was remained in the ED during his hospitalization.         Therefore, he is discharged in good and stable condition to home with close outpatient follow-up.    Discharge Date  11/27/2018    FOLLOW UP ITEMS POST DISCHARGE  Follow with primary care provider for management of diabetes    DISCHARGE DIAGNOSES  Active Problems:    Leukocytosis POA: Yes  Resolved Problems:    Chest pain POA: Yes    Bronchitis POA: Yes      FOLLOW UP  Future Appointments  Date Time Provider Department Center   12/13/2018 9:00 AM Karen Beauchamp M.D. [facility] None     [address] [id number] [phone]  In 1 week        MEDICATIONS ON DISCHARGE      Medication List      CONTINUE taking these medications      Instructions   NASACORT ALLERGY 24HR 55 MCG/ACT nasal inhaler  Generic drug:  triamcinolone   Spray 2 Sprays in nose every day.  Dose:  2 Spray     oxymetazoline 0.05 % Soln  Commonly known as:  AFRIN   Spray 2 Sprays in nose 2 times a day.  Dose:  2 Spray        STOP taking these medications    amoxicillin 500 MG Caps  Commonly known as:  AMOXIL     MethylPREDNISolone 4 MG Tbpk  Commonly known as:  MEDROL DOSEPAK            Allergies  No Known Allergies    DIET  No orders of the defined types were placed in this encounter.      ACTIVITY  As tolerated.  Weight bearing as tolerated    CONSULTATIONS  None    PROCEDURES  NM stress test    LABORATORY  Lab Results   Component Value Date    SODIUM 137 11/27/2018    POTASSIUM 4.3 11/27/2018    CHLORIDE 100 11/27/2018    CO2 29 11/27/2018    GLUCOSE 112 (H) 11/27/2018    BUN 27 (H) 11/27/2018    CREATININE 0.98 11/27/2018        Lab Results   Component Value Date    WBC 14.3 (H) 11/27/2018    HEMOGLOBIN 17.1 11/27/2018    HEMATOCRIT 51.6 11/27/2018    PLATELETCT 367 11/27/2018        Total time of the discharge process exceeds 31 minutes.

## 2018-12-13 ENCOUNTER — APPOINTMENT (OUTPATIENT)
Dept: INTERNAL MEDICINE | Facility: MEDICAL CENTER | Age: 63
End: 2018-12-13
Payer: MEDICAID

## 2019-01-31 LAB — EKG IMPRESSION: NORMAL

## 2020-10-02 ENCOUNTER — HOSPITAL ENCOUNTER (OUTPATIENT)
Facility: MEDICAL CENTER | Age: 65
End: 2020-10-02
Attending: NURSE PRACTITIONER
Payer: MEDICARE

## 2020-10-02 ENCOUNTER — OFFICE VISIT (OUTPATIENT)
Dept: URGENT CARE | Facility: CLINIC | Age: 65
End: 2020-10-02
Payer: MEDICARE

## 2020-10-02 VITALS
HEART RATE: 72 BPM | BODY MASS INDEX: 27.06 KG/M2 | HEIGHT: 70 IN | TEMPERATURE: 97.7 F | SYSTOLIC BLOOD PRESSURE: 126 MMHG | OXYGEN SATURATION: 96 % | DIASTOLIC BLOOD PRESSURE: 84 MMHG | WEIGHT: 189 LBS | RESPIRATION RATE: 16 BRPM

## 2020-10-02 DIAGNOSIS — R11.14 BILIOUS VOMITING WITH NAUSEA: ICD-10-CM

## 2020-10-02 DIAGNOSIS — Z20.822 SUSPECTED COVID-19 VIRUS INFECTION: ICD-10-CM

## 2020-10-02 DIAGNOSIS — R68.89 FLU-LIKE SYMPTOMS: ICD-10-CM

## 2020-10-02 LAB
COVID ORDER STATUS COVID19: NORMAL
FLUAV+FLUBV AG SPEC QL IA: NEGATIVE
INT CON NEG: NORMAL
INT CON POS: NORMAL
SARS-COV-2 RNA RESP QL NAA+PROBE: NOTDETECTED
SPECIMEN SOURCE: NORMAL

## 2020-10-02 PROCEDURE — U0003 INFECTIOUS AGENT DETECTION BY NUCLEIC ACID (DNA OR RNA); SEVERE ACUTE RESPIRATORY SYNDROME CORONAVIRUS 2 (SARS-COV-2) (CORONAVIRUS DISEASE [COVID-19]), AMPLIFIED PROBE TECHNIQUE, MAKING USE OF HIGH THROUGHPUT TECHNOLOGIES AS DESCRIBED BY CMS-2020-01-R: HCPCS

## 2020-10-02 PROCEDURE — 99204 OFFICE O/P NEW MOD 45 MIN: CPT | Mod: CR,CS | Performed by: NURSE PRACTITIONER

## 2020-10-02 PROCEDURE — 87804 INFLUENZA ASSAY W/OPTIC: CPT | Performed by: NURSE PRACTITIONER

## 2020-10-02 RX ORDER — ONDANSETRON 4 MG/1
4 TABLET, FILM COATED ORAL EVERY 4 HOURS PRN
Qty: 20 TAB | Refills: 0 | Status: SHIPPED | OUTPATIENT
Start: 2020-10-02 | End: 2020-10-07

## 2020-10-02 ASSESSMENT — ENCOUNTER SYMPTOMS
WHEEZING: 0
NAUSEA: 1
FEVER: 0
PALPITATIONS: 0
NERVOUS/ANXIOUS: 1
DIAPHORESIS: 1
SPUTUM PRODUCTION: 0
SHORTNESS OF BREATH: 0
MYALGIAS: 1
SINUS PAIN: 1
ABDOMINAL PAIN: 0
DIARRHEA: 1
FATIGUE: 1
SORE THROAT: 1
WEAKNESS: 1
CHILLS: 1
VOMITING: 1
COUGH: 0
HEADACHES: 1
CONSTIPATION: 0
DIZZINESS: 0

## 2020-10-02 NOTE — LETTER
October 2, 2020    To Whom It May Concern:         This is confirmation that Polo Johnson attended his scheduled appointment with TREMAINE Luevano on 10/02/20.     Your employee was seen in our clinic today.  A concern for COVID-19 has been identified and testing is in progress.  We are asking you to excuse absences as well following self-isolation protocol per Center for Disease Control (CDC).  You employee will be able to access test results through our electronic delivery system called [employer].     If the results of testing are negative, and once there has been no fever (> than 100.4 F) for at least 72 hours without treatment, and no vomiting or diarrhea for at least 48 hours, then return to work is approved.    If the results of testing are positive than your employer will be contacted by the [facility] or [facility] department for further instructions on duration of self-isolation and return to work protocol.  In general, this will also follow the CDC guidelines with a minimum of 10 days from the onset of symptoms and without fever, vomiting, or diarrhea as above.     In general, repeat testing is NOT necessary and not offered through the [facility] [place] [facility] care.     This is the only note that will be provided from the [facility] for this visit.  Your employee will require an appointment with a primary care provider if FMLA or disability forms are required.           If you have any questions please do not hesitate to call me at the phone number listed below.    Sincerely,          HEATHER Luevano.  [phone]

## 2020-10-02 NOTE — PATIENT INSTRUCTIONS
INSTRUCTIONS FOR COVID-19 OR ANY OTHER INFECTIOUS RESPIRATORY ILLNESSES    The Centers for Disease Control and Prevention (CDC) states that early indications for COVID-19 include cough, shortness of breath, difficulty breathing, or at least two of the following symptoms: chills, shaking with chills, muscle pain, headache, sore throat, and loss of taste or smell. Symptoms can range from mild to severe and may appear up to two weeks after exposure to the virus.    The practice of self-isolation and quarantine helps protect the public and your family by  preventing exposure to people who have or may have a contagious disease. Please follow the prevention steps below as based on CDC guidelines:    WHEN TO STOP ISOLATION: Persons with COVID-19 or any other infectious respiratory illness who have symptoms and were advised to care for themselves at home may discontinue home isolation under the following conditions:  · At least 24 hours have passed since recovery defined as resolution of fever without the use of fever-reducing medications; AND,  · Improvement in respiratory symptoms (e.g., cough, shortness of breath); AND,  · At least 10 days have passed since symptoms first appeared and have had no subsequent illness.    MONITOR YOUR SYMPTOMS: If your illness is worsening, seek prompt medical attention. If you have a medical emergency and need to call 911, notify the dispatch personnel that you have, or are being evaluated for confirmed or suspected COVID-19 or another infectious respiratory illness. Wear a facemask if possible.    ACTIVITY RESTRICTION: restrict activities outside your home, except for getting medical care. Do not go to work, school, or public areas. Avoid using public transportation, ride-sharing, or taxis.    SCHEDULED MEDICAL APPOINTMENTS: Notify your provider that you have, or are being evaluated for, confirmed or suspected COVID-19 or another infectious respiratory. This will help the healthcare  provider’s office safely take care of you and keep other people from getting exposed or infected.    FACEMASKS, when to wear: Anytime you are away from your home or around other people or pets. If you are unable to wear one, maintain a minimum of 6 feet distancing from others.    LIVING ENVIRONMENT: Stay in a separate room from other people and pets. If possible, use a separate bathroom, have someone else care for your pets and avoid sharing household items. Any items used should be washed thoroughly with soap and water. Clean all “high-touch” surfaces every day. Use a household cleaning spray or wipe, according to the label instructions. High touch surfaces include (but are not limited to) counters, tabletops, doorknobs, bathroom fixtures, toilets, phones, keyboards, tablets, and bedside tables.     HAND WASHING: Frequently wash hands with soap and water for at least 20 seconds,  especially after blowing your nose, coughing, or sneezing; going to the bathroom; before and after interacting with pets; and before and after eating or preparing food. If hands are visibly dirty use soap and water. If soap and water are not available, use an alcohol-based hand  with at least 60% alcohol. Avoid touching your eyes, nose, and mouth with unwashed hands. Cover your coughs and sneezes with a tissue. Throw used tissues in a lined trash can. Immediately wash your hands.    ACTIVE/FACILITATED SELF-MONITORING: Follow instructions provided by your local health department or health professionals, as appropriate. When working with your local health department check their available hours.    [facility]   Phone Number   [facility] [phone]   [facility] [place] [phone]   [place] [address]   [place] [phone]     IF YOU HAVE CONFIRMED POSITIVE COVID-19:    Those who have completely recovered from COVID-19 may have immune-boosting antibodies in their plasma--called “convalescent plasma”--that could be  used to treat critically ill COVID19 patients.    [facility] is excited to begin working with [facility] on collecting convalescent plasma from  people who have recovered from COVID-19 as part of a program to treat patients infected with the virus. This FDA-approved “emergency investigational new drug” is a special blood product containing antibodies that may give patients an extra boost to fight the virus.    To be eligible to donate convalescent plasma, you must have a prior COVID-19 diagnosis documented by a laboratory test (or a positive test result for SARS-CoV-2 antibodies) and meet additional eligibility requirements.    If you are interested in donating convalescent plasma or have any additional questions, please contact the [facility] Convalescent Plasma  at [phone] or via e-mail at [email or web address] Frequently Asked Questions  COVID-19 (coronavirus disease) is an infection that is caused by a large family of viruses. Some viruses cause illness in people and others cause illness in animals like camels, cats, and bats. In some cases, the viruses that cause illness in animals can spread to humans.  Where did the coronavirus come from?  In December 2019, [place] told the World Health Organization (WHO) of several cases of lung disease (human respiratory illness). These cases were linked to an open seafood and livestock market in the city of [facility]. The link to the seafood and livestock market suggests that the virus may have spread from animals to humans. However, since that first outbreak in December, the virus has also been shown to spread from person to person.  What is the name of the disease and the virus?  Disease name  Early on, this disease was called novel coronavirus. This is because scientists determined that the disease was caused by a new (novel) respiratory virus. The World Health Organization (WHO) has now named the disease COVID-19, or coronavirus  disease.  Virus name  The virus that causes the disease is called severe acute respiratory syndrome coronavirus 2 (SARS-CoV-2).  More information on disease and virus naming  World Health Organization (WHO): www.who.int/emergencies/diseases/novel-coronavirus-2019/technical-guidance/naming-the-coronavirus-disease-(covid-2019)-and-the-virus-that-causes-it  Who is at risk for complications from coronavirus disease?  Some people may be at higher risk for complications from coronavirus disease. This includes older adults and people who have chronic diseases, such as heart disease, diabetes, and lung disease.  If you are at higher risk for complications, take these extra precautions:  · Avoid close contact with people who are sick or have a fever or cough. Stay at least 3-6 ft (1-2 m) away from them, if possible.  · Wash your hands often with soap and water for at least 20 seconds.  · Avoid touching your face, mouth, nose, or eyes.  · Keep supplies on hand at home, such as food, medicine, and cleaning supplies.  · Stay home as much as possible.  · Avoid social gatherings and travel.  How does coronavirus disease spread?  The virus that causes coronavirus disease spreads easily from person to person (is contagious). There are also cases of community-spread disease. This means the disease has spread to:  · People who have no known contact with other infected people.  · People who have not traveled to areas where there are known cases.  It appears to spread from one person to another through droplets from coughing or sneezing.  Can I get the virus from touching surfaces or objects?  There is still a lot that we do not know about the virus that causes coronavirus disease. Scientists are basing a lot of information on what they know about similar viruses, such as:  · Viruses cannot generally survive on surfaces for long. They need a human body (host) to survive.  · It is more likely that the virus is spread by close contact  with people who are sick (direct contact), such as through:  ? Shaking hands or hugging.  ? Breathing in respiratory droplets that travel through the air. This can happen when an infected person coughs or sneezes on or near other people.  · It is less likely that the virus is spread when a person touches a surface or object that has the virus on it (indirect contact). The virus may be able to enter the body if the person touches a surface or object and then touches his or her face, eyes, nose, or mouth.  Can a person spread the virus without having symptoms of the disease?  It may be possible for the virus to spread before a person has symptoms of the disease, but this is most likely not the main way the virus is spreading. It is more likely for the virus to spread by being in close contact with people who are sick and breathing in the respiratory droplets of a sick person's cough or sneeze.  What are the symptoms of coronavirus disease?  Symptoms vary from person to person and can range from mild to severe. Symptoms may include:  · Fever.  · Cough.  · Tiredness, weakness, or fatigue.  · Fast breathing or feeling short of breath.  These symptoms can appear anywhere from 2 to 14 days after you have been exposed to the virus. If you develop symptoms, call your health care provider. People with severe symptoms may need hospital care.  If I am exposed to the virus, how long does it take before symptoms start?  Symptoms of coronavirus disease may appear anywhere from 2 to 14 days after a person has been exposed to the virus. If you develop symptoms, call your health care provider.  Should I be tested for this virus?  Your health care provider will decide whether to test you based on your symptoms, history of exposure, and your risk factors.  How does a health care provider test for this virus?  Health care providers will collect samples to send for testing. Samples may include:  · Taking a swab of fluid from the  nose.  · Taking fluid from the lungs by having you cough up mucus (sputum) into a sterile cup.  · Taking a blood sample.  · Taking a stool or urine sample.  Is there a treatment or vaccine for this virus?  Currently, there is no vaccine to prevent coronavirus disease. Also, there are no medicines like antibiotics or antivirals to treat the virus. A person who becomes sick is given supportive care, which means rest and fluids. A person may also relieve his or her symptoms by using over-the-counter medicines that treat sneezing, coughing, and runny nose. These are the same medicines that a person takes for the common cold.  If you develop symptoms, call your health care provider. People with severe symptoms may need hospital care.  What can I do to protect myself and my family from this virus?         You can protect yourself and your family by taking the same actions that you would take to prevent the spread of other viruses. Take the following actions:  · Wash your hands often with soap and water for at least 20 seconds. If soap and water are not available, use alcohol-based hand .  · Avoid touching your face, mouth, nose, or eyes.  · Cough or sneeze into a tissue, sleeve, or elbow. Do not cough or sneeze into your hand or the air.  ? If you cough or sneeze into a tissue, throw it away immediately and wash your hands.  · Disinfect objects and surfaces that you frequently touch every day.  · Avoid close contact with people who are sick or have a fever or cough. Stay at least 3-6 ft (1-2 m) away from them, if possible.  · Stay home if you are sick, except to get medical care. Call your health care provider before you get medical care.  · Make sure your vaccines are up to date. Ask your health care provider what vaccines you need.  What should I do if I need to travel?  Follow travel recommendations from your local health authority, the CDC, and WHO.  Travel information and advice  · Centers for Disease  Control and Prevention (CDC): www.cdc.gov/coronavirus/2019-ncov/travelers/index.html  · World Health Organization (WHO): www.who.int/emergencies/diseases/novel-coronavirus-2019/travel-advice  Know the risks and take action to protect your health  · You are at higher risk of getting coronavirus disease if you are traveling to areas with an outbreak or if you are exposed to travelers from areas with an outbreak.  · Wash your hands often and practice good hygiene to lower the risk of catching or spreading the virus.  What should I do if I am sick?  General instructions to stop the spread of infection  · Wash your hands often with soap and water for at least 20 seconds. If soap and water are not available, use alcohol-based hand .  · Cough or sneeze into a tissue, sleeve, or elbow. Do not cough or sneeze into your hand or the air.  · If you cough or sneeze into a tissue, throw it away immediately and wash your hands.  · Stay home unless you must get medical care. Call your health care provider or local health authority before you get medical care.  · Avoid public areas. Do not take public transportation, if possible.  · If you can, wear a mask if you must go out of the house or if you are in close contact with someone who is not sick.  Keep your home clean  · Disinfect objects and surfaces that are frequently touched every day. This may include:  ? Counters and tables.  ? Doorknobs and light switches.  ? Sinks and faucets.  ? Electronics such as phones, remote controls, keyboards, computers, and tablets.  · Wash dishes in hot, soapy water or use a . Air-dry your dishes.  · Wash laundry in hot water.  Prevent infecting other household members  · Let healthy household members care for children and pets, if possible. If you have to care for children or pets, wash your hands often and wear a mask.  · Sleep in a different bedroom or bed, if possible.  · Do not share personal items, such as razors,  toothbrushes, deodorant, sebastian, brushes, towels, and washcloths.  Where to find more information  Centers for Disease Control and Prevention (CDC)  · Information and news updates: www.cdc.gov/coronavirus/2019-ncov  World Health Organization (WHO)  · Information and news updates: www.who.int/emergencies/diseases/novel-coronavirus-2019  · Coronavirus health topic: www.who.int/health-topics/coronavirus  · Questions and answers on COVID-19: www.who.int/news-room/q-a-detail/n-q-chfoubywhdkru  · Global tracker: who.Authorly  American Academy of Pediatrics (AAP)  · Information for families: www.healthychildren.org/English/health-issues/conditions/chest-lungs/Pages/2019-Novel-Coronavirus.aspx  The coronavirus situation is changing rapidly. Check your local health authority website or the CDC and WHO websites for updates and news.  When should I contact a health care provider?  · Contact your health care provider if you have symptoms of an infection, such as fever or cough, and you:  ? Have been near anyone who is known to have coronavirus disease.  ? Have come into contact with a person who is suspected to have coronavirus disease.  ? Have traveled outside of the country.  When should I get emergency medical care?  · Get help right away by calling your local emergency services (911 in the [place] if you have:  ? Trouble breathing.  ? Pain or pressure in your chest.  ? Confusion.  ? Blue-tinged lips and fingernails.  ? Difficulty waking from sleep.  ? Symptoms that get worse.  Let the emergency medical personnel know if you think you have coronavirus disease.  Summary  · A new respiratory virus is spreading from person to person and causing COVID-19 (coronavirus disease).  · The virus that causes COVID-19 appears to spread easily. It spreads from one person to another through droplets from coughing or sneezing.  · Older adults and those with chronic diseases are at higher risk of disease. If you are at higher risk for  complications, take extra precautions.  · There is currently no vaccine to prevent coronavirus disease. There are no medicines, such as antibiotics or antivirals, to treat the virus.  · You can protect yourself and your family by washing your hands often, avoiding touching your face, and covering your coughs and sneezes.  This information is not intended to replace advice given to you by your health care provider. Make sure you discuss any questions you have with your health care provider.  Document Released: 04/14/2020 Document Revised: 04/14/2020 Document Reviewed: 04/14/2020  Elsevier Patient Education © 2020 [employer] Inc[employer]

## 2020-10-02 NOTE — PROGRESS NOTES
Subjective:     Polo Johnson is a 65 y.o. male who presents for Sinus Problem (x 3 days,  nasal congestion, stuffy nose, ear pain, scrachy throat, littler diarrhea and vomiting)      Influenza  This is a new problem. The current episode started in the past 7 days. The problem occurs constantly. The problem has been gradually worsening. Associated symptoms include chills, congestion, diaphoresis, fatigue, headaches, myalgias, nausea, a sore throat, vomiting and weakness. Pertinent negatives include no abdominal pain, chest pain, coughing, fever or urinary symptoms. Associated symptoms comments: Sinus pain. Treatments tried: benedryl 12.5 mg, afrin, tylenol. The treatment provided mild relief.       Review of Systems   Constitutional: Positive for chills, diaphoresis, fatigue and malaise/fatigue. Negative for fever.        He states he wakes up with cold sweats.    HENT: Positive for congestion, ear pain, sinus pain and sore throat.    Respiratory: Negative for cough, sputum production, shortness of breath and wheezing.    Cardiovascular: Negative for chest pain and palpitations.   Gastrointestinal: Positive for diarrhea, nausea and vomiting. Negative for abdominal pain and constipation.   Genitourinary: Negative for dysuria, frequency and urgency.   Musculoskeletal: Positive for myalgias.   Neurological: Positive for weakness and headaches. Negative for dizziness.   Endo/Heme/Allergies: Positive for environmental allergies.   Psychiatric/Behavioral: The patient is nervous/anxious.         He states he is very stressed as earlier this year he was hospitalized for similar symptoms and almost        PMH:   Past Medical History:   Diagnosis Date   • Renal carcinoma, right (HCC)    • Seasonal allergies    • Sinus infection      ALLERGIES: No Known Allergies  SURGHX:   Past Surgical History:   Procedure Laterality Date   • NEPHRECTOMY RADICAL Right      SOCHX:   Social History     Socioeconomic History   • Marital  "status:      Spouse name: Not on file   • Number of children: Not on file   • Years of education: Not on file   • Highest education level: Not on file   Occupational History   • Not on file   Social Needs   • Financial resource strain: Not on file   • Food insecurity     Worry: Not on file     Inability: Not on file   • Transportation needs     Medical: Not on file     Non-medical: Not on file   Tobacco Use   • Smoking status: Never Smoker   • Smokeless tobacco: Never Used   Substance and Sexual Activity   • Alcohol use: No   • Drug use: No   • Sexual activity: Not on file   Lifestyle   • Physical activity     Days per week: Not on file     Minutes per session: Not on file   • Stress: Not on file   Relationships   • Social connections     Talks on phone: Not on file     Gets together: Not on file     Attends Zoroastrianism service: Not on file     Active member of club or organization: Not on file     Attends meetings of clubs or organizations: Not on file     Relationship status: Not on file   • Intimate partner violence     Fear of current or ex partner: Not on file     Emotionally abused: Not on file     Physically abused: Not on file     Forced sexual activity: Not on file   Other Topics Concern   • Not on file   Social History Narrative   • Not on file     FH: No family history on file.      Objective:   /84 (BP Location: Left arm, Patient Position: Sitting, BP Cuff Size: Large adult)   Pulse 72   Temp 36.5 °C (97.7 °F) (Temporal)   Resp 16   Ht 1.778 m (5' 10\")   Wt 85.7 kg (189 lb)   SpO2 96%   BMI 27.12 kg/m²     Physical Exam  Vitals signs and nursing note reviewed.   Constitutional:       General: He is not in acute distress.     Appearance: Normal appearance. He is normal weight. He is ill-appearing. He is not toxic-appearing.   HENT:      Head: Normocephalic and atraumatic.      Right Ear: Tympanic membrane, ear canal and external ear normal. There is no impacted cerumen.      Left Ear: " Tympanic membrane, ear canal and external ear normal. There is no impacted cerumen.      Nose: Congestion present. No rhinorrhea.      Mouth/Throat:      Mouth: Mucous membranes are moist.      Pharynx: Uvula midline. Posterior oropharyngeal erythema present. No pharyngeal swelling, oropharyngeal exudate or uvula swelling.      Tonsils: No tonsillar exudate or tonsillar abscesses. 1+ on the right. 1+ on the left.   Eyes:      General:         Right eye: No discharge.         Left eye: No discharge.      Extraocular Movements: Extraocular movements intact.      Conjunctiva/sclera: Conjunctivae normal.      Pupils: Pupils are equal, round, and reactive to light.   Neck:      Musculoskeletal: Normal range of motion. Muscular tenderness present. No neck rigidity.   Cardiovascular:      Rate and Rhythm: Normal rate.      Heart sounds: Normal heart sounds. No murmur.   Pulmonary:      Effort: Pulmonary effort is normal. No respiratory distress.      Breath sounds: Normal breath sounds. No stridor. No wheezing, rhonchi or rales.   Chest:      Chest wall: No tenderness.   Abdominal:      General: Abdomen is flat. Bowel sounds are normal. There is no distension.      Palpations: Abdomen is soft.      Tenderness: There is no abdominal tenderness. There is no guarding.   Musculoskeletal: Normal range of motion.   Lymphadenopathy:      Cervical: Cervical adenopathy present.   Skin:     General: Skin is warm and dry.      Capillary Refill: Capillary refill takes less than 2 seconds.   Neurological:      General: No focal deficit present.      Mental Status: He is alert and oriented to person, place, and time. Mental status is at baseline.   Psychiatric:         Mood and Affect: Mood normal.         Behavior: Behavior normal.         Thought Content: Thought content normal.         Judgment: Judgment normal.       POCT flu: Negative  Assessment/Plan:   Assessment    1. Suspected COVID-19 virus infection  COVID/SARS COV-2 PCR   2.  Flu-like symptoms  POCT Influenza A/B   3. Bilious vomiting with nausea  ondansetron (ZOFRAN) 4 MG Tab tablet   Pt was tested for COVID today. I will call with results. Upon entering the room PPE was worn throughout the duration of his visit for both provider and patient. Mask of patient briefly removed for examination of oropharynx. PT was educated to remain in self isolation for at least 10 days following the onset of symptoms and to not return to work until symptoms have resolved for three days without the use of symptom altering medication.   He was encouraged to rest, increase fluid intake and use over-the-counter TheraFlu or DayQuil/NyQuil for his symptoms and to increase fluid intake.  Vital signs and exam in office today benign.    AVS handout given and reviewed with patient. Pt educated on red flags and when to seek treatment back in ER or UC.

## 2020-10-20 ENCOUNTER — SUPERVISING PHYSICIAN REVIEW (OUTPATIENT)
Dept: URGENT CARE | Facility: CLINIC | Age: 65
End: 2020-10-20

## 2021-03-03 DIAGNOSIS — Z23 NEED FOR VACCINATION: ICD-10-CM

## 2021-04-13 ENCOUNTER — IMMUNIZATION (OUTPATIENT)
Dept: FAMILY PLANNING/WOMEN'S HEALTH CLINIC | Facility: IMMUNIZATION CENTER | Age: 66
End: 2021-04-13
Payer: MEDICARE

## 2021-04-13 DIAGNOSIS — Z23 ENCOUNTER FOR VACCINATION: Primary | ICD-10-CM

## 2021-04-13 PROCEDURE — 91300 PFIZER SARS-COV-2 VACCINE: CPT

## 2021-04-13 PROCEDURE — 0001A PFIZER SARS-COV-2 VACCINE: CPT

## 2021-05-06 ENCOUNTER — PATIENT OUTREACH (OUTPATIENT)
Dept: SCHEDULING | Facility: IMAGING CENTER | Age: 66
End: 2021-05-06

## 2021-05-06 NOTE — PROGRESS NOTES
Attempt #1    Outreach for 2nd covid vaccine    Outreach Outcome pt. Will be going to sisters appt./ pt. Did not want to make his own appt.     Transfer to [phone] if patient calls back to schedule appointment.

## 2021-12-22 ENCOUNTER — APPOINTMENT (OUTPATIENT)
Dept: URGENT CARE | Facility: CLINIC | Age: 66
End: 2021-12-22
Payer: MEDICARE

## 2021-12-22 ENCOUNTER — OFFICE VISIT (OUTPATIENT)
Dept: URGENT CARE | Facility: CLINIC | Age: 66
End: 2021-12-22
Payer: MEDICARE

## 2021-12-22 VITALS
RESPIRATION RATE: 16 BRPM | HEIGHT: 70 IN | TEMPERATURE: 98.3 F | DIASTOLIC BLOOD PRESSURE: 84 MMHG | HEART RATE: 92 BPM | BODY MASS INDEX: 28.69 KG/M2 | SYSTOLIC BLOOD PRESSURE: 158 MMHG | WEIGHT: 200.4 LBS | OXYGEN SATURATION: 93 %

## 2021-12-22 DIAGNOSIS — R35.0 URINARY FREQUENCY: ICD-10-CM

## 2021-12-22 DIAGNOSIS — H60.502 ACUTE OTITIS EXTERNA OF LEFT EAR, UNSPECIFIED TYPE: ICD-10-CM

## 2021-12-22 DIAGNOSIS — Z83.3 FAMILY HISTORY OF DIABETES MELLITUS TYPE II: ICD-10-CM

## 2021-12-22 PROCEDURE — 99213 OFFICE O/P EST LOW 20 MIN: CPT | Performed by: PHYSICIAN ASSISTANT

## 2021-12-22 RX ORDER — NEOMYCIN SULFATE, POLYMYXIN B SULFATE, HYDROCORTISONE 3.5; 10000; 1 MG/ML; [USP'U]/ML; MG/ML
1 SOLUTION/ DROPS AURICULAR (OTIC) 4 TIMES DAILY
Qty: 3 ML | Refills: 0 | Status: SHIPPED | OUTPATIENT
Start: 2021-12-22 | End: 2021-12-29

## 2021-12-23 NOTE — PROGRESS NOTES
"Subjective:   Polo Johnson is a 66 y.o. male who presents for Ear Pain (x1wk, left ear bump and pain, excessive sweating, hunger pains)      HPI  66 y.o. male presents to urgent care with new problem to provider of left ear pain and mild congestion x 1 week. No sore throat, cough, fevers, or headaches.   Patient also states recent hx of night sweats, urinary frequency, and increased appetite over the last few months. Reports family hx of type II DM.   Denies other associated aggravating or alleviating factors.     Review of Systems   Constitutional: Negative for chills, fever and malaise/fatigue.   HENT: Positive for congestion and ear pain. Negative for sore throat.    Respiratory: Negative for cough and sputum production.    Cardiovascular: Negative for chest pain and palpitations.   Gastrointestinal: Negative for abdominal pain, diarrhea, nausea and vomiting.   Genitourinary: Positive for frequency. Negative for dysuria, flank pain and hematuria.   Musculoskeletal: Negative for myalgias.   Neurological: Negative for dizziness and headaches.       Patient Active Problem List   Diagnosis   • Leukocytosis     Past Surgical History:   Procedure Laterality Date   • NEPHRECTOMY RADICAL Right      Social History     Tobacco Use   • Smoking status: Never Smoker   • Smokeless tobacco: Never Used   Vaping Use   • Vaping Use: Never used   Substance Use Topics   • Alcohol use: No   • Drug use: No      History reviewed. No pertinent family history.   (Allergies, Medications, & Tobacco/Substance Use were reconciled by the Medical Assistant and reviewed by myself. The family history is prepopulated)     Objective:     /84 (BP Location: Left arm, Patient Position: Sitting, BP Cuff Size: Adult)   Pulse 92   Temp 36.8 °C (98.3 °F) (Temporal)   Resp 16   Ht 1.778 m (5' 10\")   Wt 90.9 kg (200 lb 6.4 oz)   SpO2 93%   BMI 28.75 kg/m²     Physical Exam  Vitals reviewed.   Constitutional:       Appearance: Normal " appearance.   HENT:      Head: Normocephalic and atraumatic.      Right Ear: Tympanic membrane, ear canal and external ear normal.      Left Ear: Tympanic membrane is not injected or erythematous.      Ears:      Comments: Minimal swelling and erythema of left ear canal     Nose: Congestion present.      Mouth/Throat:      Mouth: Mucous membranes are moist.      Pharynx: Oropharynx is clear.   Eyes:      Conjunctiva/sclera: Conjunctivae normal.   Cardiovascular:      Rate and Rhythm: Normal rate and regular rhythm.      Heart sounds: Normal heart sounds.   Pulmonary:      Effort: Pulmonary effort is normal. No respiratory distress.      Breath sounds: Normal breath sounds.   Musculoskeletal:         General: Normal range of motion.      Cervical back: Normal range of motion and neck supple.   Lymphadenopathy:      Cervical: No cervical adenopathy.   Skin:     General: Skin is warm and dry.   Neurological:      General: No focal deficit present.      Mental Status: He is alert and oriented to person, place, and time.   Psychiatric:         Mood and Affect: Mood normal.         Behavior: Behavior normal.         Thought Content: Thought content normal.         Judgment: Judgment normal.         Assessment/Plan:     1. Urinary frequency  HEMOGLOBIN A1C    CANCELED: POCT  A1C   2. Family history of diabetes mellitus type II  HEMOGLOBIN A1C   3. Acute otitis externa of left ear, unspecified type  NEOMYCIN-POLYMYXIN-HC, OTIC, 1 % Solution     Hemoglobin A1c ordered to evaluate for diabetes.  Patient does have a history of previously increased hemoglobin A1c, however that was 3 years ago and patient has not had follow-up since.    Patient should trial OTC antihistamines and NSAIDs and I would recommend otic drops if symptoms of left ear pain persist or worsen.  Differential diagnosis, natural history, supportive care, and indications for immediate follow-up discussed.    Advised the patient to follow-up with the primary  care physician for recheck, reevaluation, and consideration of further management.  Patient verbalized understanding of treatment plan and has no further questions regarding care.     I personally reviewed prior external notes and test results pertinent to today's visit.  Please note that this dictation was created using voice recognition software. I have made a reasonable attempt to correct obvious errors, but I expect that there are errors of grammar and possibly content that I did not discover before finalizing the note.    This note was electronically signed by Radha Yuan PA-C

## 2021-12-24 ASSESSMENT — ENCOUNTER SYMPTOMS
ABDOMINAL PAIN: 0
FLANK PAIN: 0
CHILLS: 0
DIZZINESS: 0
SORE THROAT: 0
NAUSEA: 0
DIARRHEA: 0
PALPITATIONS: 0
FEVER: 0
COUGH: 0
MYALGIAS: 0
HEADACHES: 0
VOMITING: 0
SPUTUM PRODUCTION: 0

## 2022-03-24 ENCOUNTER — OFFICE VISIT (OUTPATIENT)
Dept: URGENT CARE | Facility: CLINIC | Age: 67
End: 2022-03-24
Payer: MEDICARE

## 2022-03-24 VITALS
RESPIRATION RATE: 16 BRPM | SYSTOLIC BLOOD PRESSURE: 124 MMHG | TEMPERATURE: 97.2 F | BODY MASS INDEX: 28.63 KG/M2 | WEIGHT: 200 LBS | HEIGHT: 70 IN | DIASTOLIC BLOOD PRESSURE: 76 MMHG | OXYGEN SATURATION: 97 % | HEART RATE: 94 BPM

## 2022-03-24 DIAGNOSIS — M62.838 MASSETER MUSCLE SPASM: ICD-10-CM

## 2022-03-24 PROCEDURE — 99213 OFFICE O/P EST LOW 20 MIN: CPT | Performed by: PHYSICIAN ASSISTANT

## 2022-03-24 NOTE — PROGRESS NOTES
"Subjective:   Polo Johnson is a 66 y.o. male who presents for Ear Pain (Stated in December, (L) ear, tender, after sweets it flares up, swelling )      HPI  Patient is a 66-year-old male who presents to clinic with complaints of left ear pain onset 1 to 2 months ago.  He was seen 1 time and was prescribed steroid drops due to inflammation of his ear.  The pain is worse when he is eating candy.  He does chew gum every night.  The pain is located more so in front of his ear.  No fevers or chills.  Denies any drainage.  Denies any sore throat.        Medications:    • This patient does not have an active medication from one of the medication groupers.    Allergies: Patient has no known allergies.    Problem List: Polo Johnson does not have any pertinent problems on file.    Surgical History:  Past Surgical History:   Procedure Laterality Date   • NEPHRECTOMY RADICAL Right        Past Social Hx: Polo Johnson  reports that he has never smoked. He has never used smokeless tobacco. He reports that he does not drink alcohol and does not use drugs.     Past Family Hx:  Polo Johnson family history is not on file.     Problem list, medications, and allergies reviewed by myself today in Epic.     Objective:     /76 (BP Location: Left arm, Patient Position: Sitting, BP Cuff Size: Adult)   Pulse 94   Temp 36.2 °C (97.2 °F) (Temporal)   Resp 16   Ht 1.778 m (5' 10\")   Wt 90.7 kg (200 lb)   SpO2 97%   BMI 28.70 kg/m²     Physical Exam  Vitals reviewed.   Constitutional:       General: He is not in acute distress.     Appearance: Normal appearance. He is not ill-appearing or toxic-appearing.   HENT:      Head:        Comments: Mild focal TTP to left masster. Negative erythema, edema, crepitus, or deformity. Negative palpable mass.   Negative TMJ pain.      Right Ear: Tympanic membrane, ear canal and external ear normal.      Left Ear: Tympanic membrane, ear canal and external ear normal.      " Mouth/Throat:      Mouth: Mucous membranes are moist.      Pharynx: Oropharynx is clear. No oropharyngeal exudate or posterior oropharyngeal erythema.   Eyes:      Conjunctiva/sclera: Conjunctivae normal.      Pupils: Pupils are equal, round, and reactive to light.   Cardiovascular:      Rate and Rhythm: Normal rate and regular rhythm.      Heart sounds: Normal heart sounds.   Pulmonary:      Effort: Pulmonary effort is normal. No respiratory distress.      Breath sounds: Normal breath sounds. No wheezing, rhonchi or rales.   Musculoskeletal:      Cervical back: Neck supple.   Skin:     General: Skin is warm and dry.   Neurological:      General: No focal deficit present.      Mental Status: He is alert and oriented to person, place, and time.   Psychiatric:         Mood and Affect: Mood normal.         Behavior: Behavior normal.         Diagnosis and associated orders:     1. Masseter muscle spasm       Comments/MDM:     • This is a 66-year-old male who presents to clinic with complaints of left ear pain onset 1 to 2 months ago.  See full history above.  No signs of otitis media, otitis externa, parotiditis, or herpes zoster today in the clinic.  There is no swelling or palpable mass.  I do feel his intermittent discomfort is from chewing gum and candy causing masseter muscle spasm.  Recommend symptomatic supportive care at this time.  Avoid chewing gum.  Avoid chewy foods.  Stick with soft foods until symptoms resolve.  Jaw exercises against resistance as discussed and demonstrated.  Ibuprofen or Tylenol for any pain.       I personally reviewed prior external notes and test results pertinent to today's visit. Supportive care, natural history, differential diagnoses, and indications for immediate follow-up discussed. Patient expresses understanding and agrees to plan. Patient denies any other questions or concerns.     Follow-up with the primary care physician for recheck, reevaluation, and consideration of  further management.    Please note that this dictation was created using voice recognition software. I have made a reasonable attempt to correct obvious errors, but I expect that there are errors of grammar and possibly content that I did not discover before finalizing the note.    This note was electronically signed by Alejandro Borges PA-C

## 2022-09-12 ENCOUNTER — OFFICE VISIT (OUTPATIENT)
Dept: URGENT CARE | Facility: CLINIC | Age: 67
End: 2022-09-12
Payer: MEDICARE

## 2022-09-12 ENCOUNTER — HOSPITAL ENCOUNTER (OUTPATIENT)
Dept: RADIOLOGY | Facility: MEDICAL CENTER | Age: 67
End: 2022-09-12
Attending: FAMILY MEDICINE
Payer: MEDICARE

## 2022-09-12 VITALS
RESPIRATION RATE: 14 BRPM | HEIGHT: 70 IN | WEIGHT: 190 LBS | TEMPERATURE: 97.5 F | OXYGEN SATURATION: 95 % | SYSTOLIC BLOOD PRESSURE: 122 MMHG | HEART RATE: 90 BPM | BODY MASS INDEX: 27.2 KG/M2 | DIASTOLIC BLOOD PRESSURE: 80 MMHG

## 2022-09-12 DIAGNOSIS — K11.8 PAROTID MASS: ICD-10-CM

## 2022-09-12 DIAGNOSIS — R22.0 FACIAL SWELLING: ICD-10-CM

## 2022-09-12 PROCEDURE — 76536 US EXAM OF HEAD AND NECK: CPT

## 2022-09-12 PROCEDURE — 99214 OFFICE O/P EST MOD 30 MIN: CPT | Performed by: FAMILY MEDICINE

## 2022-09-12 RX ORDER — AMOXICILLIN AND CLAVULANATE POTASSIUM 875; 125 MG/1; MG/1
1 TABLET, FILM COATED ORAL 2 TIMES DAILY
Qty: 14 TABLET | Refills: 0 | Status: SHIPPED | OUTPATIENT
Start: 2022-09-12 | End: 2022-09-19

## 2022-09-12 ASSESSMENT — ENCOUNTER SYMPTOMS
MYALGIAS: 0
NAUSEA: 0
VOMITING: 0
EYE REDNESS: 0
WEIGHT LOSS: 0
EYE DISCHARGE: 0

## 2022-09-12 NOTE — PROGRESS NOTES
"Subjective     Polo Johnson is a 67 y.o. male who presents with Ear Injury (LT EAR SWOLLEN , RT EYE STY)            9 months waxing and waning swelling over left parotid gland.  Minimal pain.  No skin redness or drainage.  No fever.  No prodrome.  No other aggravating or alleviating factors.      Review of Systems   Constitutional:  Negative for malaise/fatigue and weight loss.   Eyes:  Negative for discharge and redness.   Gastrointestinal:  Negative for nausea and vomiting.   Musculoskeletal:  Negative for joint pain and myalgias.   Skin:  Negative for itching and rash.            Objective     /80 (BP Location: Left arm, Patient Position: Sitting, BP Cuff Size: Adult)   Pulse 90   Temp 36.4 °C (97.5 °F) (Temporal)   Resp 14   Ht 1.778 m (5' 10\")   Wt 86.2 kg (190 lb)   SpO2 95%   BMI 27.26 kg/m²      Physical Exam  Constitutional:       Appearance: Normal appearance.   HENT:      Head:     Neurological:      Mental Status: He is alert.                           Assessment & Plan     Ultrasound per radiology:  Lobulated irregular heterogeneous solid left parotid mass for which malignancy must be excluded. ENT consultation is recommended.    1. Facial swelling  US-SOFT TISSUES OF HEAD - NECK    amoxicillin-clavulanate (AUGMENTIN) 875-125 MG Tab      2. Parotid mass  Referral to ENT        Differential diagnosis, natural history, supportive care, and indications for immediate follow-up discussed at length.     Follow-up ENT.          "

## 2022-09-12 NOTE — RESULT ENCOUNTER NOTE
Attempted to call with results.  No answer and no option for voicemail.  Will call again later this evening.  ENT referral is placed.

## 2022-09-13 NOTE — RESULT ENCOUNTER NOTE
2 more attempted calls with no answer and no option for voicemail.  His emergency contact is his mother.  I talked to her briefly and she said she would have her daughter text him.  Call after that was still unanswered.  I will try again tomorrow morning.

## 2022-09-27 ENCOUNTER — HOSPITAL ENCOUNTER (OUTPATIENT)
Dept: RADIOLOGY | Facility: MEDICAL CENTER | Age: 67
End: 2022-09-27
Attending: FAMILY MEDICINE
Payer: MEDICARE

## 2022-09-27 DIAGNOSIS — K11.8 PAROTID MASS: ICD-10-CM

## 2022-09-27 PROCEDURE — 10005 FNA BX W/US GDN 1ST LES: CPT

## 2022-09-27 PROCEDURE — 88173 CYTOPATH EVAL FNA REPORT: CPT

## 2022-09-27 NOTE — PROGRESS NOTES
US guided Left parotid mass fine needle aspiration done by Dr. Stallings; NON-SEDATION (no H&P required as this is a NON SEDATION procedure) left ant to ear access site; 1 jar of cytolyt obtained and sent to pathology lab; pt tolerated the procedure well; pt hemodynamically stable pre/intra/post procedure; all questions and concerns answered prior to being d/c; patient provided with appropriate education for procedure; pt d/c home.

## 2022-10-07 NOTE — RESULT ENCOUNTER NOTE
Attempted several times to call with results.  I got through to Polo around noon on 10/7/2022.  He has seen ENT and they are planning further evaluation with MRI and likely excision at the end of the month.

## 2022-10-12 ENCOUNTER — HOSPITAL ENCOUNTER (OUTPATIENT)
Facility: MEDICAL CENTER | Age: 67
End: 2022-10-12
Attending: OTOLARYNGOLOGY | Admitting: OTOLARYNGOLOGY
Payer: MEDICARE

## 2022-10-12 ENCOUNTER — HOSPITAL ENCOUNTER (OUTPATIENT)
Dept: RADIOLOGY | Facility: MEDICAL CENTER | Age: 67
End: 2022-10-12
Attending: OTOLARYNGOLOGY
Payer: MEDICARE

## 2022-10-12 DIAGNOSIS — D37.030 NEOPLASM OF UNCERTAIN BEHAVIOR OF PAROTID GLAND: ICD-10-CM

## 2022-10-14 ENCOUNTER — PRE-ADMISSION TESTING (OUTPATIENT)
Dept: ADMISSIONS | Facility: MEDICAL CENTER | Age: 67
End: 2022-10-14
Attending: OTOLARYNGOLOGY
Payer: MEDICARE

## 2022-10-14 ENCOUNTER — HOSPITAL ENCOUNTER (OUTPATIENT)
Dept: RADIOLOGY | Facility: MEDICAL CENTER | Age: 67
End: 2022-10-14
Attending: OTOLARYNGOLOGY
Payer: MEDICARE

## 2022-10-14 VITALS — HEIGHT: 70 IN | WEIGHT: 187.61 LBS | BODY MASS INDEX: 26.86 KG/M2

## 2022-10-14 DIAGNOSIS — Z01.810 PRE-OPERATIVE CARDIOVASCULAR EXAMINATION: ICD-10-CM

## 2022-10-14 DIAGNOSIS — Z01.812 PRE-OPERATIVE LABORATORY EXAMINATION: ICD-10-CM

## 2022-10-14 LAB
ABO + RH BLD: NORMAL
ABO GROUP BLD: NORMAL
ANION GAP SERPL CALC-SCNC: 8 MMOL/L (ref 7–16)
BLD GP AB SCN SERPL QL: NORMAL
BUN SERPL-MCNC: 20 MG/DL (ref 8–22)
CALCIUM SERPL-MCNC: 9.8 MG/DL (ref 8.5–10.5)
CHLORIDE SERPL-SCNC: 101 MMOL/L (ref 96–112)
CO2 SERPL-SCNC: 29 MMOL/L (ref 20–33)
CREAT SERPL-MCNC: 1.12 MG/DL (ref 0.5–1.4)
EKG IMPRESSION: NORMAL
ERYTHROCYTE [DISTWIDTH] IN BLOOD BY AUTOMATED COUNT: 48.8 FL (ref 35.9–50)
GFR SERPLBLD CREATININE-BSD FMLA CKD-EPI: 71 ML/MIN/1.73 M 2
GLUCOSE SERPL-MCNC: 71 MG/DL (ref 65–99)
HCT VFR BLD AUTO: 50.6 % (ref 42–52)
HGB BLD-MCNC: 16.6 G/DL (ref 14–18)
MCH RBC QN AUTO: 30 PG (ref 27–33)
MCHC RBC AUTO-ENTMCNC: 32.8 G/DL (ref 33.7–35.3)
MCV RBC AUTO: 91.3 FL (ref 81.4–97.8)
PLATELET # BLD AUTO: 329 K/UL (ref 164–446)
PMV BLD AUTO: 10.7 FL (ref 9–12.9)
POTASSIUM SERPL-SCNC: 4.3 MMOL/L (ref 3.6–5.5)
RBC # BLD AUTO: 5.54 M/UL (ref 4.7–6.1)
RH BLD: NORMAL
SODIUM SERPL-SCNC: 138 MMOL/L (ref 135–145)
WBC # BLD AUTO: 11.1 K/UL (ref 4.8–10.8)

## 2022-10-14 PROCEDURE — 93005 ELECTROCARDIOGRAM TRACING: CPT

## 2022-10-14 PROCEDURE — 93010 ELECTROCARDIOGRAM REPORT: CPT | Performed by: INTERNAL MEDICINE

## 2022-10-14 PROCEDURE — 36415 COLL VENOUS BLD VENIPUNCTURE: CPT

## 2022-10-14 PROCEDURE — 86901 BLOOD TYPING SEROLOGIC RH(D): CPT

## 2022-10-14 PROCEDURE — 85027 COMPLETE CBC AUTOMATED: CPT

## 2022-10-14 PROCEDURE — A9576 INJ PROHANCE MULTIPACK: HCPCS | Performed by: OTOLARYNGOLOGY

## 2022-10-14 PROCEDURE — 70543 MRI ORBT/FAC/NCK W/O &W/DYE: CPT

## 2022-10-14 PROCEDURE — 86900 BLOOD TYPING SEROLOGIC ABO: CPT

## 2022-10-14 PROCEDURE — 80048 BASIC METABOLIC PNL TOTAL CA: CPT

## 2022-10-14 PROCEDURE — 86850 RBC ANTIBODY SCREEN: CPT

## 2022-10-14 PROCEDURE — 700117 HCHG RX CONTRAST REV CODE 255: Performed by: OTOLARYNGOLOGY

## 2022-10-14 RX ORDER — LORAZEPAM 1 MG/1
1 TABLET ORAL ONCE
COMMUNITY
End: 2022-10-24

## 2022-10-14 RX ORDER — OMEPRAZOLE 20 MG/1
20 CAPSULE, DELAYED RELEASE ORAL DAILY
COMMUNITY

## 2022-10-14 RX ADMIN — GADOTERIDOL 15 ML: 279.3 INJECTION, SOLUTION INTRAVENOUS at 10:15

## 2022-10-24 ENCOUNTER — TELEPHONE (OUTPATIENT)
Dept: URGENT CARE | Facility: CLINIC | Age: 67
End: 2022-10-24

## 2022-10-24 ENCOUNTER — OFFICE VISIT (OUTPATIENT)
Dept: URGENT CARE | Facility: CLINIC | Age: 67
End: 2022-10-24
Payer: MEDICARE

## 2022-10-24 VITALS
SYSTOLIC BLOOD PRESSURE: 122 MMHG | DIASTOLIC BLOOD PRESSURE: 88 MMHG | RESPIRATION RATE: 16 BRPM | HEART RATE: 99 BPM | OXYGEN SATURATION: 94 % | TEMPERATURE: 98.9 F

## 2022-10-24 DIAGNOSIS — G89.29 CHRONIC LEFT-SIDED THORACIC BACK PAIN: ICD-10-CM

## 2022-10-24 DIAGNOSIS — M54.6 CHRONIC LEFT-SIDED THORACIC BACK PAIN: ICD-10-CM

## 2022-10-24 DIAGNOSIS — M62.830 BACK SPASM: ICD-10-CM

## 2022-10-24 PROCEDURE — 99214 OFFICE O/P EST MOD 30 MIN: CPT | Performed by: NURSE PRACTITIONER

## 2022-10-24 RX ORDER — METHYLPREDNISOLONE 4 MG/1
TABLET ORAL
Qty: 1 EACH | Refills: 0 | Status: SHIPPED | OUTPATIENT
Start: 2022-10-24 | End: 2023-02-21

## 2022-10-24 RX ORDER — CYCLOBENZAPRINE HCL 5 MG
5-10 TABLET ORAL EVERY 8 HOURS PRN
Qty: 30 TABLET | Refills: 0 | Status: ON HOLD | OUTPATIENT
Start: 2022-10-24 | End: 2023-02-22

## 2022-10-24 ASSESSMENT — ENCOUNTER SYMPTOMS
CONSTITUTIONAL NEGATIVE: 1
NEUROLOGICAL NEGATIVE: 1
BACK PAIN: 1

## 2022-10-24 ASSESSMENT — VISUAL ACUITY: OU: 1

## 2022-10-24 NOTE — TELEPHONE ENCOUNTER
Hello ,   Patient called and would like to talk to you in regards to his last visit with you that was a month ago.    # [phone]  Thank you.

## 2022-10-24 NOTE — PROGRESS NOTES
Subjective:     Polo Johnson is a 67 y.o. male who presents for Back Pain (Back pain, requesting refill for medrol dosepak)       Back Pain  This is a chronic problem. The problem has been gradually worsening since onset.     History of chronic, recurrent back pain. Flare up Thursday. Tried Salonpas and heat with no improvement. Requesting Medrol Dosepak which has helped before.    Review of Systems   Constitutional: Negative.    Musculoskeletal:  Positive for back pain.   Neurological: Negative.    All other systems reviewed and are negative.    Refer to HPI for additional details.    During this visit, appropriate PPE was worn, hand hygiene was performed, and the patient and any visitors were masked.    PMH:  has a past medical history of Arthritis, Bronchitis (2020), Cancer ([facility]) (1990), Dental disorder, Heart burn, Indigestion, Pneumonia (2020), Psychiatric problem, Renal carcinoma, right ([facility]), Seasonal allergies, and Sinus infection.    MEDS:   Current Outpatient Medications:     methylPREDNISolone (MEDROL DOSEPAK) 4 MG Tablet Therapy Pack, Use as directed on package. May take all of Day 1 as a single dose (24 mg) when starting., Disp: 1 Each, Rfl: 0    cyclobenzaprine (FLEXERIL) 5 mg tablet, Take 1-2 Tablets by mouth every 8 hours as needed for Muscle Spasms., Disp: 30 Tablet, Rfl: 0    Levocetirizine Dihydrochloride (XYZAL ALLERGY 24HR PO), Take 1 Tablet by mouth at bedtime as needed., Disp: , Rfl:     omeprazole (PRILOSEC) 20 MG delayed-release capsule, Take 20 mg by mouth every day., Disp: , Rfl:     Oxymetazoline HCl (AFRIN NASAL SPRAY NA), Administer  into affected nostril(S) as needed., Disp: , Rfl:     ALLERGIES: No Known Allergies  SURGHX:   Past Surgical History:   Procedure Laterality Date    NEPHRECTOMY RADICAL Right      SOCHX:  reports that he has never smoked. He has never used smokeless tobacco. He reports that he does not drink alcohol and does not use drugs.    FH: Per \[facility]\"" as  applicable/pertinent.      Objective:     /88 (BP Location: Left arm, Patient Position: Sitting, BP Cuff Size: Large adult)   Pulse 99   Temp 37.2 °C (98.9 °F) (Temporal)   Resp 16   SpO2 94%     Physical Exam  Nursing note reviewed.   Constitutional:       General: He is not in acute distress.     Appearance: He is well-developed. He is not ill-appearing or toxic-appearing.   Eyes:      General: Vision grossly intact.   Cardiovascular:      Rate and Rhythm: Normal rate.   Pulmonary:      Effort: Pulmonary effort is normal. No respiratory distress.   Musculoskeletal:         General: No deformity.      Thoracic back: Spasms and tenderness (L paraspinous musculature) present. No swelling, deformity or lacerations. Decreased range of motion.   Skin:     General: Skin is warm and dry.      Coloration: Skin is not pale.      Findings: No bruising, erythema or rash.   Neurological:      Mental Status: He is alert and oriented to person, place, and time.      Motor: No weakness.      Gait: Gait normal.   Psychiatric:         Behavior: Behavior normal. Behavior is cooperative.       Assessment/Plan:     1. Chronic left-sided thoracic back pain  - methylPREDNISolone (MEDROL DOSEPAK) 4 MG Tablet Therapy Pack; Use as directed on package. May take all of Day 1 as a single dose (24 mg) when starting.  Dispense: 1 Each; Refill: 0  - cyclobenzaprine (FLEXERIL) 5 mg tablet; Take 1-2 Tablets by mouth every 8 hours as needed for Muscle Spasms.  Dispense: 30 Tablet; Refill: 0    2. Back spasm  - methylPREDNISolone (MEDROL DOSEPAK) 4 MG Tablet Therapy Pack; Use as directed on package. May take all of Day 1 as a single dose (24 mg) when starting.  Dispense: 1 Each; Refill: 0  - cyclobenzaprine (FLEXERIL) 5 mg tablet; Take 1-2 Tablets by mouth every 8 hours as needed for Muscle Spasms.  Dispense: 30 Tablet; Refill: 0    Rx as above sent electronically. Continue heat. May use OTC Tylenol PRN. Transition to OTC NSAID when  finished with steroid.    Differential diagnosis, natural history, supportive care, over-the-counter symptom management per 's instructions, close monitoring, and indications for immediate follow-up discussed.     All questions answered. Patient agrees with the plan of care.    Billing note: chronic illness with exacerbation/progression; prescription drug management

## 2022-10-25 LAB — CYTOLOGY REG CYTOL: NORMAL

## 2023-01-31 ENCOUNTER — HOSPITAL ENCOUNTER (OUTPATIENT)
Dept: RADIOLOGY | Facility: MEDICAL CENTER | Age: 68
End: 2023-01-31
Payer: MEDICARE

## 2023-01-31 DIAGNOSIS — K11.8 MASS OF PAROTID GLAND: ICD-10-CM

## 2023-01-31 PROCEDURE — 700117 HCHG RX CONTRAST REV CODE 255

## 2023-01-31 PROCEDURE — 70491 CT SOFT TISSUE NECK W/DYE: CPT

## 2023-01-31 PROCEDURE — 71260 CT THORAX DX C+: CPT

## 2023-01-31 RX ADMIN — IOHEXOL 50 ML: 350 INJECTION, SOLUTION INTRAVENOUS at 13:37

## 2023-01-31 RX ADMIN — IOHEXOL 50 ML: 350 INJECTION, SOLUTION INTRAVENOUS at 13:39

## 2023-02-10 ENCOUNTER — HOSPITAL ENCOUNTER (OUTPATIENT)
Dept: RADIOLOGY | Facility: MEDICAL CENTER | Age: 68
End: 2023-02-10
Payer: MEDICARE

## 2023-02-10 DIAGNOSIS — K11.8 MASS OF PAROTID GLAND: ICD-10-CM

## 2023-02-10 PROCEDURE — 700102 HCHG RX REV CODE 250 W/ 637 OVERRIDE(OP): Performed by: RADIOLOGY

## 2023-02-10 PROCEDURE — 70543 MRI ORBT/FAC/NCK W/O &W/DYE: CPT

## 2023-02-10 PROCEDURE — A9579 GAD-BASE MR CONTRAST NOS,1ML: HCPCS | Performed by: INTERNAL MEDICINE

## 2023-02-10 PROCEDURE — A9270 NON-COVERED ITEM OR SERVICE: HCPCS | Performed by: RADIOLOGY

## 2023-02-10 PROCEDURE — 700117 HCHG RX CONTRAST REV CODE 255: Performed by: INTERNAL MEDICINE

## 2023-02-10 RX ORDER — DIAZEPAM 5 MG/1
10 TABLET ORAL
Status: COMPLETED | OUTPATIENT
Start: 2023-02-10 | End: 2023-02-10

## 2023-02-10 RX ADMIN — GADOTERIDOL 20 ML: 279.3 INJECTION, SOLUTION INTRAVENOUS at 13:05

## 2023-02-10 RX ADMIN — DIAZEPAM 10 MG: 5 TABLET ORAL at 12:14

## 2023-02-10 NOTE — PROGRESS NOTES
Patient arrived to OP MRI. Medicated with 10 mg PO Valium. Patient tolerated well. Dc instructions reviewed with patient and sister, Carol. Ride home with sister.

## 2023-02-10 NOTE — DISCHARGE INSTRUCTIONS
MRI DISCHARGE  ADULT DISCHARGE INSTRUCTIONS    __________________________________________________________________________        YOU HAVE BEEN MEDICATED TODAY FOR YOUR SCN. PLEASE FOLLOW THE INSTRUCTIONS BELOW TO ENSURE YOUR SAFE RECOVERY. IF YOU HAVE ANY QUESTIONS OR PROBLEMS FEEL FREE TO CALL US [phone].    1) HAVE SOMEONE STAY WITH YOU TO ASSIST YOU AS NEEDED.    2) DO NOT DRIVE OR OPERATE ANY MECHANICAL DEVICES.    3) DO NOT PERFORM ANY ACTIVITY THAT REQUIRES CONCENTRATION, MAKE NO MAJOR DECISIONS OVER THE NEXT 24 HOURS.    4) BE CAREFUL CHANGING POSITIONS FROM LAYING DOWN TO SITTING OR STANDING AS YOU MAY BECOME DIZZY.     5) DO NOT DRINK ALCOHOL FOR 48 HOURS.     6) THERE ARE NO RESTRICTIONS FOR EATING YOUR NORMAL MEALS. DRINK FLUIDS.    7) YOU MAY CONTINUE YOUR USUAL MEDICATIONS FOR PAIN, TRANQUILIZERS,  MUSCLE RELAXANTS OR SEDATIVES AFTER 12 HOURS.     8) TOMORROW, YOU MAY CONTINUE YOUR NORMAL DAILY ACTIVITIES.     9) LEAVE PRESSURE DRESSING ON 10 - 15 MINUTES. IF SWELLING OR BLEEDING OCCURS WHEN REMOVED, CONTINUE PLACING DIRECT PRESSURE ON SITE FOR  ANOTHER 5 MINUTES OR UNTIL BLEEDING STOPS.     I HAVE BEEN INFORMED OF AND UNDERSTAND THE ABOVE DISCHARGE INSTRUCTIONS.       __________________________________                               PATIENT/FAMILY MEMBER SIGNATURE      __________________________________                                                RELATIONSHIP TO PATIENT    2/10/2023                        12:49 PM      __________________________________  NURSE OR WITNESS SIGNATURE            Diazepam (VALIUM) Oral Tablet  What is this medicine?  You were prescribed DIAZEPAM (dye AZ e pedro luis) for the procedure you had today. This medication is a benzodiazepine. It is used to treat anxiety and nervousness. It also can help treat alcohol withdrawal, relax muscles, and treat certain types of seizures.  This medicine may be used for other purposes; ask your health care provider or pharmacist if you have  questions.  What side effects may I notice from receiving this medicine?  Side effects that you should report to your doctor or health care professional as soon as possible:  allergic reactions like skin rash, itching or hives, swelling of the face, lips, or tongue  angry, confused, depressed, other mood changes  breathing problems  feeling faint or lightheaded, falls  muscle cramps  problems with balance, talking, walking  restlessness  tremors  trouble passing urine or change in the amount of urine  unusually weak or tired  Side effects that usually do not require medical attention (report to your doctor or health care professional if they continue or are bothersome):  difficulty sleeping, nightmares  dizziness, drowsiness, clumsiness, or unsteadiness, a hangover effect  headache  nausea, vomiting  This list may not describe all possible side effects. Call your doctor for medical advice about side effects. You may report side effects to FDA at 7-789-FDA-2536.

## 2023-02-13 ENCOUNTER — HOSPITAL ENCOUNTER (OUTPATIENT)
Dept: RADIOLOGY | Facility: MEDICAL CENTER | Age: 68
End: 2023-02-13
Attending: INTERNAL MEDICINE
Payer: MEDICARE

## 2023-02-13 VITALS — WEIGHT: 196 LBS | HEIGHT: 69 IN | BODY MASS INDEX: 29.03 KG/M2

## 2023-02-13 DIAGNOSIS — C80.1 MALIGNANT NEOPLASTIC DISEASE (HCC): ICD-10-CM

## 2023-02-13 DIAGNOSIS — C07 MALIGNANT NEOPLASM OF PAROTID GLAND (HCC): ICD-10-CM

## 2023-02-13 DIAGNOSIS — C64.1 MALIGNANT NEOPLASM OF RIGHT KIDNEY, EXCEPT RENAL PELVIS (HCC): ICD-10-CM

## 2023-02-13 PROCEDURE — A9270 NON-COVERED ITEM OR SERVICE: HCPCS | Performed by: RADIOLOGY

## 2023-02-13 PROCEDURE — 700117 HCHG RX CONTRAST REV CODE 255: Performed by: INTERNAL MEDICINE

## 2023-02-13 PROCEDURE — 70553 MRI BRAIN STEM W/O & W/DYE: CPT

## 2023-02-13 PROCEDURE — 700102 HCHG RX REV CODE 250 W/ 637 OVERRIDE(OP): Performed by: RADIOLOGY

## 2023-02-13 PROCEDURE — A9579 GAD-BASE MR CONTRAST NOS,1ML: HCPCS | Performed by: INTERNAL MEDICINE

## 2023-02-13 RX ORDER — DIAZEPAM 5 MG/1
10 TABLET ORAL
Status: COMPLETED | OUTPATIENT
Start: 2023-02-13 | End: 2023-02-13

## 2023-02-13 RX ADMIN — DIAZEPAM 10 MG: 5 TABLET ORAL at 13:42

## 2023-02-13 RX ADMIN — GADOTERIDOL 20 ML: 279.3 INJECTION, SOLUTION INTRAVENOUS at 15:04

## 2023-02-13 NOTE — DISCHARGE INSTRUCTIONS
MRI ADULT DISCHARGE INSTRUCTIONS    You have been medicated today for your scan. Please follow the instructions below to ensure your safe recovery. If you have any questions or problems, feel free to call us at [phone] or [phone].     1.   Have someone stay with you to assist you as needed.    2.   Do not drive or operate any mechanical devices.    3.   Do not perform any activity that requires concentration. Make no major decisions over the next 24 hours.     4.   Be careful changing positions from laying down to sitting or standing, as you may become dizzy.     5.   Do not drink alcohol for 48 hours.    6.   There are no restrictions for eating your normal meals. Drink fluids.    7.   You may continue your usual medications for pain, tranquilizers, muscle relaxants or sedatives when awake.     8.   Tomorrow, you may continue your normal daily activities.     9.   Pressure dressing on 10 - 15 minutes. If swelling or bleeding occurs when removed, continue placing direct pressure on injection site for another 5 minutes, or until bleeding stops.   Diazepam (VALIUM) Oral solution  What is this medicine?  You were prescribed DIAZEPAM (dye AZ e pedro luis) for the procedure you had today. This medication is a benzodiazepine. It is used to treat anxiety and nervousness. It also can help treat alcohol withdrawal, relax muscles, and treat certain types of seizures.  This medicine may be used for other purposes; ask your health care provider or pharmacist if you have questions.  What side effects may I notice from receiving this medicine?  Side effects that you should report to your doctor or health care professional as soon as possible:  allergic reactions like skin rash, itching or hives, swelling of the face, lips, or tongue  angry, confused, depressed, other mood changes  breathing problems  feeling faint or lightheaded, falls  muscle cramps  problems with balance, talking, walking  restlessness  tremors  trouble passing  urine or change in the amount of urine  unusually weak or tired  Side effects that usually do not require medical attention (report to your doctor or health care professional if they continue or are bothersome):  difficulty sleeping, nightmares  dizziness, drowsiness, clumsiness, or unsteadiness, a hangover effect  headache  nausea, vomiting  This list may not describe all possible side effects. Call your doctor for medical advice about side effects. You may report side effects to FDA at 6-988-IJA-7858.   I have been informed of and understand the above discharge instructions.

## 2023-02-15 ENCOUNTER — HOSPITAL ENCOUNTER (OUTPATIENT)
Dept: RADIOLOGY | Facility: MEDICAL CENTER | Age: 68
End: 2023-02-15
Attending: INTERNAL MEDICINE
Payer: MEDICARE

## 2023-02-15 DIAGNOSIS — C07 MALIGNANT NEOPLASM OF PAROTID GLAND (HCC): ICD-10-CM

## 2023-02-15 DIAGNOSIS — C64.1 MALIGNANT NEOPLASM OF RIGHT KIDNEY, EXCEPT RENAL PELVIS (HCC): ICD-10-CM

## 2023-02-15 DIAGNOSIS — C80.1 MALIGNANT NEOPLASTIC DISEASE (HCC): ICD-10-CM

## 2023-02-15 PROCEDURE — A9552 F18 FDG: HCPCS

## 2023-02-21 ENCOUNTER — PRE-ADMISSION TESTING (OUTPATIENT)
Dept: ADMISSIONS | Facility: MEDICAL CENTER | Age: 68
End: 2023-02-21
Attending: INTERNAL MEDICINE
Payer: MEDICARE

## 2023-02-21 RX ORDER — BUSPIRONE HYDROCHLORIDE 7.5 MG/1
15 TABLET ORAL 2 TIMES DAILY
COMMUNITY

## 2023-02-22 ENCOUNTER — HOSPITAL ENCOUNTER (OUTPATIENT)
Facility: MEDICAL CENTER | Age: 68
End: 2023-02-22
Attending: INTERNAL MEDICINE | Admitting: INTERNAL MEDICINE
Payer: MEDICARE

## 2023-02-22 ENCOUNTER — APPOINTMENT (OUTPATIENT)
Dept: RADIOLOGY | Facility: MEDICAL CENTER | Age: 68
End: 2023-02-22
Attending: RADIOLOGY
Payer: MEDICARE

## 2023-02-22 ENCOUNTER — APPOINTMENT (OUTPATIENT)
Dept: RADIOLOGY | Facility: MEDICAL CENTER | Age: 68
End: 2023-02-22
Attending: INTERNAL MEDICINE
Payer: MEDICARE

## 2023-02-22 VITALS
WEIGHT: 190.26 LBS | HEART RATE: 94 BPM | BODY MASS INDEX: 28.18 KG/M2 | TEMPERATURE: 96.8 F | OXYGEN SATURATION: 94 % | SYSTOLIC BLOOD PRESSURE: 161 MMHG | HEIGHT: 69 IN | RESPIRATION RATE: 16 BRPM | DIASTOLIC BLOOD PRESSURE: 94 MMHG

## 2023-02-22 DIAGNOSIS — C80.1 MALIGNANT NEOPLASTIC DISEASE (HCC): ICD-10-CM

## 2023-02-22 DIAGNOSIS — C07 MALIGNANT NEOPLASM OF PAROTID GLAND (HCC): ICD-10-CM

## 2023-02-22 DIAGNOSIS — C64.1 MALIGNANT NEOPLASM OF RIGHT KIDNEY, EXCEPT RENAL PELVIS (HCC): ICD-10-CM

## 2023-02-22 LAB
INR PPP: 0.98 (ref 0.87–1.13)
PATHOLOGY CONSULT NOTE: NORMAL
PROTHROMBIN TIME: 12.9 SEC (ref 12–14.6)

## 2023-02-22 PROCEDURE — 160046 HCHG PACU - 1ST 60 MINS PHASE II

## 2023-02-22 PROCEDURE — 88341 IMHCHEM/IMCYTCHM EA ADD ANTB: CPT | Mod: 91

## 2023-02-22 PROCEDURE — 88307 TISSUE EXAM BY PATHOLOGIST: CPT

## 2023-02-22 PROCEDURE — 88342 IMHCHEM/IMCYTCHM 1ST ANTB: CPT

## 2023-02-22 PROCEDURE — 88313 SPECIAL STAINS GROUP 2: CPT | Mod: 91

## 2023-02-22 PROCEDURE — 700111 HCHG RX REV CODE 636 W/ 250 OVERRIDE (IP): Performed by: RADIOLOGY

## 2023-02-22 PROCEDURE — 85610 PROTHROMBIN TIME: CPT

## 2023-02-22 PROCEDURE — 71045 X-RAY EXAM CHEST 1 VIEW: CPT

## 2023-02-22 PROCEDURE — 700105 HCHG RX REV CODE 258: Performed by: RADIOLOGY

## 2023-02-22 PROCEDURE — 88305 TISSUE EXAM BY PATHOLOGIST: CPT

## 2023-02-22 PROCEDURE — 42400 BIOPSY OF SALIVARY GLAND: CPT

## 2023-02-22 PROCEDURE — 21550 BIOPSY OF NECK/CHEST: CPT | Mod: LT

## 2023-02-22 PROCEDURE — 160035 HCHG PACU - 1ST 60 MINS PHASE I

## 2023-02-22 PROCEDURE — 77012 CT SCAN FOR NEEDLE BIOPSY: CPT

## 2023-02-22 PROCEDURE — 36415 COLL VENOUS BLD VENIPUNCTURE: CPT

## 2023-02-22 PROCEDURE — 700111 HCHG RX REV CODE 636 W/ 250 OVERRIDE (IP)

## 2023-02-22 PROCEDURE — 160036 HCHG PACU - EA ADDL 30 MINS PHASE I

## 2023-02-22 PROCEDURE — 160002 HCHG RECOVERY MINUTES (STAT)

## 2023-02-22 RX ORDER — ONDANSETRON 2 MG/ML
4 INJECTION INTRAMUSCULAR; INTRAVENOUS EVERY 6 HOURS PRN
Status: ACTIVE | OUTPATIENT
Start: 2023-02-22 | End: 2023-02-22

## 2023-02-22 RX ORDER — SODIUM CHLORIDE 9 MG/ML
INJECTION, SOLUTION INTRAVENOUS CONTINUOUS
Status: DISCONTINUED | OUTPATIENT
Start: 2023-02-22 | End: 2023-02-22 | Stop reason: HOSPADM

## 2023-02-22 RX ORDER — MIDAZOLAM HYDROCHLORIDE 1 MG/ML
INJECTION INTRAMUSCULAR; INTRAVENOUS
Status: COMPLETED
Start: 2023-02-22 | End: 2023-02-22

## 2023-02-22 RX ORDER — HYDROMORPHONE HYDROCHLORIDE 1 MG/ML
0.5 INJECTION, SOLUTION INTRAMUSCULAR; INTRAVENOUS; SUBCUTANEOUS
Status: DISCONTINUED | OUTPATIENT
Start: 2023-02-22 | End: 2023-02-22 | Stop reason: HOSPADM

## 2023-02-22 RX ORDER — MIDAZOLAM HYDROCHLORIDE 1 MG/ML
.5-2 INJECTION INTRAMUSCULAR; INTRAVENOUS PRN
Status: ACTIVE | OUTPATIENT
Start: 2023-02-22 | End: 2023-02-22

## 2023-02-22 RX ORDER — DIPHENHYDRAMINE HCL 25 MG
25 TABLET ORAL NIGHTLY PRN
COMMUNITY

## 2023-02-22 RX ORDER — SODIUM CHLORIDE 9 MG/ML
500 INJECTION, SOLUTION INTRAVENOUS
Status: ACTIVE | OUTPATIENT
Start: 2023-02-22 | End: 2023-02-22

## 2023-02-22 RX ORDER — ACETAMINOPHEN 500 MG
500-1000 TABLET ORAL 2 TIMES DAILY PRN
COMMUNITY

## 2023-02-22 RX ORDER — OXYCODONE HYDROCHLORIDE 5 MG/1
5 TABLET ORAL
Status: DISCONTINUED | OUTPATIENT
Start: 2023-02-22 | End: 2023-02-22 | Stop reason: HOSPADM

## 2023-02-22 RX ADMIN — FENTANYL CITRATE 50 MCG: 50 INJECTION, SOLUTION INTRAMUSCULAR; INTRAVENOUS at 11:33

## 2023-02-22 RX ADMIN — FENTANYL CITRATE 50 MCG: 50 INJECTION, SOLUTION INTRAMUSCULAR; INTRAVENOUS at 11:35

## 2023-02-22 RX ADMIN — SODIUM CHLORIDE: 9 INJECTION, SOLUTION INTRAVENOUS at 09:32

## 2023-02-22 RX ADMIN — MIDAZOLAM HYDROCHLORIDE 2 MG: 1 INJECTION INTRAMUSCULAR; INTRAVENOUS at 11:33

## 2023-02-22 RX ADMIN — FENTANYL CITRATE 50 MCG: 50 INJECTION, SOLUTION INTRAMUSCULAR; INTRAVENOUS at 11:39

## 2023-02-22 RX ADMIN — MIDAZOLAM HYDROCHLORIDE 2 MG: 1 INJECTION, SOLUTION INTRAMUSCULAR; INTRAVENOUS at 11:33

## 2023-02-22 RX ADMIN — MIDAZOLAM HYDROCHLORIDE 2 MG: 1 INJECTION INTRAMUSCULAR; INTRAVENOUS at 11:38

## 2023-02-22 RX ADMIN — MIDAZOLAM HYDROCHLORIDE 2 MG: 1 INJECTION, SOLUTION INTRAMUSCULAR; INTRAVENOUS at 11:38

## 2023-02-22 NOTE — OR NURSING
Time out done with IR ROBERTA Bauman for continuity of care . Patient's daughter took all patients' belongings.Patient transported alert and oriented x 4 not in any distress nor in pain.

## 2023-02-22 NOTE — PROGRESS NOTES
Pt presents to CT 4. Pt was consented by MD at bedside, confirmed by this RN and consent at bedside. Pt transferred to CT table in right side down position. Patient underwent a left parotid mass and 7 rib lesion biopsy by Dr. Elaine. Procedure site was marked by MD and verified using imaging guidance. Pt placed on monitor, prepped and draped in a sterile fashion. Vitals were taken every 5 minutes and remained stable during procedure (see doc flow sheet for results). CO2 waveform capnography was monitored and remained WNL throughout procedure. Report called to Belinda GRANADOS. Pt transported by dwaine with RN to PACU. Core Labs X 3 from parotid mass and 3 cores from 7th rib lesion. hand delivered to lab by [email or web address].

## 2023-02-22 NOTE — OR NURSING
1206- Pt arrives to PACU from IR on 2L of oxygen via nc. Report received. Cath sites CDI. Pt denies pain and nausea.    1219- Pt daughter Ginny updated on POC and pt status.     1310- CXR at bedside.     1345- Report called to Sara GRANADOS.

## 2023-02-22 NOTE — OR SURGEON
Immediate Post- Operative Note        Findings: rcc, L parotid and L 7th rib masses      Procedure(s): CT biopsy L parotid and L 7th rib      Estimated Blood Loss: Less than 5 ml        Complications: None            2/22/2023     11:55 AM     Parminder Elaine M.D.

## 2023-02-22 NOTE — H&P
History and Physical    Date: 2/22/2023    PCP: Danielle Velasquez M.D.      CC: L parotid and L 7th rib masses    HPI: This is a 67 y.o. male who is presenting with above, h/o RCC    Past Medical History:   Diagnosis Date    Arthritis     shoulder    Bronchitis 2020    Cancer (HCC) 1990    Dental disorder     full dentures    Heart burn     Indigestion     Pneumonia 2020    Psychiatric problem     anxiety    Renal carcinoma, right (HCC) 1998    Seasonal allergies     Sinus infection        Past Surgical History:   Procedure Laterality Date    NEPHRECTOMY RADICAL Right        Current Facility-Administered Medications   Medication Dose Route Frequency Provider Last Rate Last Admin    NS infusion   Intravenous Continuous Roc Estrada M.D. 125 mL/hr at 02/22/23 0932 New Bag at 02/22/23 0932        Social History     Socioeconomic History    Marital status: Single     Spouse name: Not on file    Number of children: Not on file    Years of education: Not on file    Highest education level: Not on file   Occupational History    Not on file   Tobacco Use    Smoking status: Never    Smokeless tobacco: Never   Vaping Use    Vaping Use: Never used   Substance and Sexual Activity    Alcohol use: No    Drug use: No    Sexual activity: Not on file   Other Topics Concern    Not on file   Social History Narrative    Not on file     Social Determinants of Health     Financial Resource Strain: Not on file   Food Insecurity: Not on file   Transportation Needs: Not on file   Physical Activity: Not on file   Stress: Not on file   Social Connections: Not on file   Intimate Partner Violence: Not on file   Housing Stability: Not on file       History reviewed. No pertinent family history.    Allergies:  Patient has no known allergies.    Review of Systems:  Negative except for pain    Physical Exam  Constitutional:       Appearance: He is well-developed.   HENT:      Head: Normocephalic and atraumatic.   Eyes:      General: No scleral  icterus.        Right eye: No discharge.         Left eye: No discharge.      Conjunctiva/sclera: Conjunctivae normal.   Cardiovascular:      Rate and Rhythm: Regular rhythm.   Pulmonary:      Effort: Pulmonary effort is normal. No respiratory distress.   Abdominal:      Palpations: Abdomen is soft.   Musculoskeletal:      Cervical back: Neck supple.   Skin:     General: Skin is warm and dry.   Neurological:      Mental Status: He is alert and oriented to person, place, and time.   Psychiatric:         Behavior: Behavior normal.         Thought Content: Thought content normal.         Judgment: Judgment normal.       Vital Signs  Blood Pressure : (!) 138/95   Temperature: 36 °C (96.8 °F)   Pulse: (!) 110   Respiration: 16   Pulse Oximetry: 96 %            Assessment and Plan:This is a 67 y.o. here for CT biopsy L parotid and L 7th rib

## 2023-02-22 NOTE — OR NURSING
1406 - pt's bedrest complete per MD orders. Discharge instructions reviewed with pt.  Waiting for pt's daughter to arrive with pt's clothes. No needs at this time. Tolerating Sprite. Call light in reach.    1420 - pt's daughter here. Pt getting dressed.    1427 - pt verbalizes readiness for discharge. Pt wanted to walk out. Daughter at side, gate steady. No further needs.

## 2023-02-22 NOTE — DISCHARGE INSTRUCTIONS
HOME CARE INSTRUCTIONS    ACTIVITY: Rest and take it easy for the first 24 hours.  A responsible adult is recommended to remain with you during that time.  It is normal to feel sleepy.  We encourage you to not do anything that requires balance, judgment or coordination.    FOR 24 HOURS DO NOT:  Drive, operate machinery or run household appliances.  Drink beer or alcoholic beverages.  Make important decisions or sign legal documents.    SPECIAL INSTRUCTIONS:   Needle Biopsy, Care After  This sheet gives you information about how to care for yourself after your procedure. Your health care provider may also give you more specific instructions. If you have problems or questions, contact your health care provider.  What can I expect after the procedure?  After the procedure, it is common to have soreness, bruising, or mild pain at the puncture site. This should go away in a few days.  Follow these instructions at home:  Needle insertion site care    Wash your hands with soap and water before you change your bandage (dressing). If you cannot use soap and water, use hand .  Follow instructions from your health care provider about how to take care of your puncture site. This includes:  When and how to change your dressing.  When to remove your dressing.  Check your puncture site every day for signs of infection. Check for:  Redness, swelling, or pain.  Fluid or blood.  Pus or a bad smell.  Warmth.  General instructions  Return to your normal activities as told by your health care provider. Ask your health care provider what activities are safe for you.  Do not take baths, swim, or use a hot tub until your health care provider approves. Ask your health care provider if you may take showers. You may only be allowed to take sponge baths.  Take over-the-counter and prescription medicines only as told by your health care provider.  Keep all follow-up visits as told by your health care provider. This is  important.  Contact a health care provider if:  You have a fever.  You have redness, swelling, or pain at the puncture site that lasts longer than a few days.  You have fluid, blood, or pus coming from your puncture site.  Your puncture site feels warm to the touch.  Get help right away if:  You have severe bleeding from the puncture site.  Summary  After the procedure, it is common to have soreness, bruising, or mild pain at the puncture site. This should go away in a few days.  Check your puncture site every day for signs of infection, such as redness, swelling, or pain.  Get help right away if you have severe bleeding from your puncture site.  This information is not intended to replace advice given to you by your health care provider. Make sure you discuss any questions you have with your health care provider.  Document Released: 05/03/2016 Document Revised: 03/01/2019 Document Reviewed: 12/31/2018  [employer] Patient Education © 2020 [employer]      DIET: To avoid nausea, slowly advance diet as tolerated, avoiding spicy or greasy foods for the first day.  Add more substantial food to your diet according to your physician's instructions.  Babies can be fed formula or breast milk as soon as they are hungry.  INCREASE FLUIDS AND FIBER TO AVOID CONSTIPATION.    SURGICAL DRESSING/BATHING:   Keep dressing on for 24 hours.  Okay to remove dressing on 2/23.  May shower once dressings are removed.  No baths/soaking in water until cleared by Dr.    MEDICATIONS: Resume taking daily medication.  Take prescribed pain medication with food.  If no medication is prescribed, you may take non-aspirin pain medication if needed.  PAIN MEDICATION CAN BE VERY CONSTIPATING.  Take a stool softener or laxative such as senokot, pericolace, or milk of magnesia if needed.    No new prescriptions.  No pain medication given in recovery.    A follow-up appointment should be arranged with your doctor as scheduled; call to schedule.    You  should CALL YOUR PHYSICIAN if you develop:  Fever greater than 101 degrees F.  Pain not relieved by medication, or persistent nausea or vomiting.  Excessive bleeding (blood soaking through dressing) or unexpected drainage from the wound.  Extreme redness or swelling around the incision site, drainage of pus or foul smelling drainage.  Inability to urinate or empty your bladder within 8 hours.  Problems with breathing or chest pain.    You should call 911 if you develop problems with breathing or chest pain.  If you are unable to contact your doctor or surgical center, you should go to the nearest emergency room or urgent care center.  Physician's telephone #: Dr. Gallagher [phone]    MILD FLU-LIKE SYMPTOMS ARE NORMAL.  YOU MAY EXPERIENCE GENERALIZED MUSCLE ACHES, THROAT IRRITATION, HEADACHE AND/OR SOME NAUSEA.    If any questions arise, call your doctor.  If your doctor is not available, please feel free to call the Surgical Center at [phone].  The Center is open Monday through Friday from 7AM to 7PM.      A registered nurse may call you a few days after your surgery to see how you are doing after your procedure.    You may also receive a survey in the mail within the next two weeks and we ask that you take a few moments to complete the survey and return it to us.  Our goal is to provide you with very good care and we value your comments.     Depression / Suicide Risk    As you are discharged from this [facility] facility, it is important to learn how to keep safe from harming yourself.    Recognize the warning signs:  Abrupt changes in personality, positive or negative- including increase in energy   Giving away possessions  Change in eating patterns- significant weight changes-  positive or negative  Change in sleeping patterns- unable to sleep or sleeping all the time   Unwillingness or inability to communicate  Depression  Unusual sadness, discouragement and loneliness  Talk of wanting to  die  Neglect of personal appearance   Rebelliousness- reckless behavior  Withdrawal from people/activities they love  Confusion- inability to concentrate     If you or a loved one observes any of these behaviors or has concerns about self-harm, here's what you can do:  Talk about it- your feelings and reasons for harming yourself  Remove any means that you might use to hurt yourself (examples: pills, rope, extension cords, firearm)  Get professional help from the community (Mental Health, Substance Abuse, psychological counseling)  Do not be alone:Call your Safe Contact- someone whom you trust who will be there for you.  Call your local CRISIS HOTLINE 628-2982 or 679-549-8867  Call your local Children's Mobile Crisis Response Team [place] (385) 581-1936 or www.[employer]  Call the toll free National Suicide Prevention Hotlines   National Suicide Prevention Lifeline 315-825-YJSJ (4929)  National [place] Line Network 800-SUICIDE (277-2026)    I acknowledge receipt and understanding of these Home Care instructions.

## 2023-03-07 DIAGNOSIS — C64.9 RENAL CELL CARCINOMA, UNSPECIFIED LATERALITY (HCC): ICD-10-CM

## 2023-03-07 RX ORDER — 0.9 % SODIUM CHLORIDE 0.9 %
10 VIAL (ML) INJECTION PRN
Status: CANCELLED | OUTPATIENT
Start: 2023-03-22

## 2023-03-07 RX ORDER — ONDANSETRON 8 MG/1
8 TABLET, ORALLY DISINTEGRATING ORAL PRN
Status: CANCELLED | OUTPATIENT
Start: 2023-03-23

## 2023-03-07 RX ORDER — HEPARIN SODIUM (PORCINE) LOCK FLUSH IV SOLN 100 UNIT/ML 100 UNIT/ML
500 SOLUTION INTRAVENOUS PRN
Status: CANCELLED | OUTPATIENT
Start: 2023-03-22

## 2023-03-07 RX ORDER — 0.9 % SODIUM CHLORIDE 0.9 %
VIAL (ML) INJECTION PRN
Status: CANCELLED | OUTPATIENT
Start: 2023-03-22

## 2023-03-07 RX ORDER — SODIUM CHLORIDE 9 MG/ML
INJECTION, SOLUTION INTRAVENOUS CONTINUOUS
Status: CANCELLED | OUTPATIENT
Start: 2023-03-23

## 2023-03-07 RX ORDER — 0.9 % SODIUM CHLORIDE 0.9 %
3 VIAL (ML) INJECTION PRN
Status: CANCELLED | OUTPATIENT
Start: 2023-03-22

## 2023-03-07 RX ORDER — PROCHLORPERAZINE MALEATE 10 MG
10 TABLET ORAL EVERY 6 HOURS PRN
Status: CANCELLED | OUTPATIENT
Start: 2023-03-23

## 2023-03-07 RX ORDER — HEPARIN SODIUM (PORCINE) LOCK FLUSH IV SOLN 100 UNIT/ML 100 UNIT/ML
500 SOLUTION INTRAVENOUS PRN
Status: CANCELLED | OUTPATIENT
Start: 2023-03-23

## 2023-03-07 RX ORDER — DIPHENHYDRAMINE HYDROCHLORIDE 50 MG/ML
50 INJECTION INTRAMUSCULAR; INTRAVENOUS PRN
Status: CANCELLED | OUTPATIENT
Start: 2023-03-23

## 2023-03-07 RX ORDER — ONDANSETRON 2 MG/ML
4 INJECTION INTRAMUSCULAR; INTRAVENOUS PRN
Status: CANCELLED | OUTPATIENT
Start: 2023-03-23

## 2023-03-07 RX ORDER — METHYLPREDNISOLONE SODIUM SUCCINATE 125 MG/2ML
125 INJECTION, POWDER, LYOPHILIZED, FOR SOLUTION INTRAMUSCULAR; INTRAVENOUS PRN
Status: CANCELLED | OUTPATIENT
Start: 2023-03-23

## 2023-03-07 RX ORDER — 0.9 % SODIUM CHLORIDE 0.9 %
10 VIAL (ML) INJECTION PRN
Status: CANCELLED | OUTPATIENT
Start: 2023-03-23

## 2023-03-07 RX ORDER — 0.9 % SODIUM CHLORIDE 0.9 %
VIAL (ML) INJECTION PRN
Status: CANCELLED | OUTPATIENT
Start: 2023-03-23

## 2023-03-07 RX ORDER — 0.9 % SODIUM CHLORIDE 0.9 %
3 VIAL (ML) INJECTION PRN
Status: CANCELLED | OUTPATIENT
Start: 2023-03-23

## 2023-03-07 RX ORDER — EPINEPHRINE 1 MG/ML(1)
0.5 AMPUL (ML) INJECTION PRN
Status: CANCELLED | OUTPATIENT
Start: 2023-03-23

## 2023-03-18 NOTE — PROGRESS NOTES
"Pharmacy Chemotherapy Calculation:    Dx: renal cell carcinoma         Protocol: Nivolumab + Ipilimumab followed by Nivolumab     *Dosing Reference*  Nivolumab 3 mg/kg IV over 30 min on Day 1 followed by  Ipilimumab 1 mg/kg IV over 30 min on Day 1   21-day cycle for 4 cycles  ~followed by~  Nivolumab 240 or 480 mg IV over 30 min on Day 1  14- or 28-day cycle until disease progression or unacceptable toxicity  NCCN Guidelines for Kidney Cancer V.3.2023  Issac FRAIRE, et al. J Clin Oncol. 2017;35(34):2571-9600    Allergies:  Patient has no known allergies.     BP (!) 160/86   Pulse 90   Temp 37.3 °C (99.1 °F) (Temporal)   Resp 16   Ht 1.72 m (5' 7.72\")   Wt 89 kg (196 lb 3.4 oz)   SpO2 95%   BMI 30.08 kg/m²  Body surface area is 2.06 meters squared.    All labs (3/23/23) and thyroid panel within treatment plan parameters.       Drug Order   (Drug name, dose, route, IV Fluid & volume, frequency, number of doses) Cycle 1, Day 1      Previous treatment: none     Medication = Nivolumab (Opdivo)  Base Dose = 3 mg/kg  Calc Dose: Base Dose x 89 kg = 267 mg  Final Dose = 240 mg  Route = IV  Fluid & Volume =  mL  Admin Duration = Over 30 min          Rounded down to vial size (within 10%) per dose rounding protocol   Medication = Ipilimumab (Yervoy)  Base Dose = 1 mg/kg  Calc Dose: Base Dose x 89 kg = 89 mg  Final Dose = 85 mg  Route = IV  Fluid & Volume = NS 50 mL  Admin Duration = Over 30 min          <10% difference, okay to treat with final dose (unable to round to vial size as it would exceed 10% difference)     By my signature below, I confirm this process was performed independently with the BSA and all final chemotherapy dosing calculations congruent. I have reviewed the above chemotherapy order and that my calculation of the final dose and BSA (when applicable) corroborate those calculations of the  pharmacist. Discrepancies of 10% or greater in the written dose have been addressed and documented " within the EPIC Progress notes.      Marsha Soto, PharmD

## 2023-03-22 NOTE — PROGRESS NOTES
"Pharmacy Chemotherapy Verification    Dx: Renal cell cacinoma  Cycle: 1 (Previous treatment = N/A)  Regimen and Dosing Reference  Nivolumab 3 mg/kg IV over 30 minutes on Day 1 followed by   Ipilimumab 1 mg/kg IV over 30 minutes on Day 1  21 day cycle for 4 cycles followed by   Nivluumab 480 mg IV over 30 minutes on Day 1   28 day cycle until disease progression or unacceptable toxicity  NCCN Guidelines for Kidney Cancer.V.3.2023  Issac HJ, et al. J Clin Oncol. 2017;35(34):4678-5768  Motlakeshia TRAN, et al. Lancet Oncol. 2019;20(10):1452-0923  Atabdi MB, et al. J Clin Oncol. 2021;39(15_suppl);4510    Allergies:Patient has no known allergies.  BP (!) 160/86   Pulse 90   Temp 37.3 °C (99.1 °F) (Temporal)   Resp 16   Ht 1.72 m (5' 7.72\")   Wt 89 kg (196 lb 3.4 oz)   SpO2 95%   BMI 30.08 kg/m²   Body surface area is 2.06 meters squared.    Labs 3/23/23  ANC 7940 Hgb 17 Plt 281k  SCr 1.13 CrCl 78.1 mL/min   AST/ALT/AP = 20/20/118 Tbili = 0.8  TSH/Free T4 in process    Nivolumab 3 mg/kg x 89 kg = 267 mg   <10% difference, OK to treat with final dose = 240 mg IV    Ipilimumab 1 mg/kg x 89 kg = 89 mg   <10% difference, OK to treat with final dose = 85 mg IV    Kristine Batista, PharmD, BCOP      "

## 2023-03-23 ENCOUNTER — OUTPATIENT INFUSION SERVICES (OUTPATIENT)
Dept: ONCOLOGY | Facility: MEDICAL CENTER | Age: 68
End: 2023-03-23
Attending: INTERNAL MEDICINE
Payer: MEDICARE

## 2023-03-23 VITALS
HEART RATE: 90 BPM | OXYGEN SATURATION: 95 % | TEMPERATURE: 99.1 F | WEIGHT: 196.21 LBS | HEIGHT: 68 IN | BODY MASS INDEX: 29.74 KG/M2 | DIASTOLIC BLOOD PRESSURE: 86 MMHG | RESPIRATION RATE: 16 BRPM | SYSTOLIC BLOOD PRESSURE: 160 MMHG

## 2023-03-23 DIAGNOSIS — C64.9 RENAL CELL CARCINOMA, UNSPECIFIED LATERALITY (HCC): ICD-10-CM

## 2023-03-23 LAB
ALBUMIN SERPL BCP-MCNC: 4.7 G/DL (ref 3.2–4.9)
ALBUMIN/GLOB SERPL: 1.2 G/DL
ALP SERPL-CCNC: 118 U/L (ref 30–99)
ALT SERPL-CCNC: 20 U/L (ref 2–50)
ANION GAP SERPL CALC-SCNC: 14 MMOL/L (ref 7–16)
AST SERPL-CCNC: 20 U/L (ref 12–45)
BASOPHILS # BLD AUTO: 0.8 % (ref 0–1.8)
BASOPHILS # BLD: 0.1 K/UL (ref 0–0.12)
BILIRUB SERPL-MCNC: 0.7 MG/DL (ref 0.1–1.5)
BUN SERPL-MCNC: 23 MG/DL (ref 8–22)
CALCIUM ALBUM COR SERPL-MCNC: 9.7 MG/DL (ref 8.5–10.5)
CALCIUM SERPL-MCNC: 10.3 MG/DL (ref 8.5–10.5)
CHLORIDE SERPL-SCNC: 103 MMOL/L (ref 96–112)
CO2 SERPL-SCNC: 23 MMOL/L (ref 20–33)
CORTIS SERPL-MCNC: 11.2 UG/DL (ref 0–23)
CREAT SERPL-MCNC: 1.13 MG/DL (ref 0.5–1.4)
EOSINOPHIL # BLD AUTO: 0.06 K/UL (ref 0–0.51)
EOSINOPHIL NFR BLD: 0.5 % (ref 0–6.9)
ERYTHROCYTE [DISTWIDTH] IN BLOOD BY AUTOMATED COUNT: 47.7 FL (ref 35.9–50)
GFR SERPLBLD CREATININE-BSD FMLA CKD-EPI: 71 ML/MIN/1.73 M 2
GLOBULIN SER CALC-MCNC: 3.8 G/DL (ref 1.9–3.5)
GLUCOSE SERPL-MCNC: 101 MG/DL (ref 65–99)
HCT VFR BLD AUTO: 51.5 % (ref 42–52)
HGB BLD-MCNC: 17 G/DL (ref 14–18)
IMM GRANULOCYTES # BLD AUTO: 0.05 K/UL (ref 0–0.11)
IMM GRANULOCYTES NFR BLD AUTO: 0.4 % (ref 0–0.9)
LYMPHOCYTES # BLD AUTO: 3.14 K/UL (ref 1–4.8)
LYMPHOCYTES NFR BLD: 25.5 % (ref 22–41)
MCH RBC QN AUTO: 29 PG (ref 27–33)
MCHC RBC AUTO-ENTMCNC: 33 G/DL (ref 33.7–35.3)
MCV RBC AUTO: 87.9 FL (ref 81.4–97.8)
MONOCYTES # BLD AUTO: 1 K/UL (ref 0–0.85)
MONOCYTES NFR BLD AUTO: 8.1 % (ref 0–13.4)
NEUTROPHILS # BLD AUTO: 7.94 K/UL (ref 1.82–7.42)
NEUTROPHILS NFR BLD: 64.7 % (ref 44–72)
NRBC # BLD AUTO: 0 K/UL
NRBC BLD-RTO: 0 /100 WBC
OUTPT INFUS CBC COMMENT OICOM: ABNORMAL
PLATELET # BLD AUTO: 281 K/UL (ref 164–446)
PMV BLD AUTO: 10.6 FL (ref 9–12.9)
POTASSIUM SERPL-SCNC: 4.7 MMOL/L (ref 3.6–5.5)
PROT SERPL-MCNC: 8.5 G/DL (ref 6–8.2)
RBC # BLD AUTO: 5.86 M/UL (ref 4.7–6.1)
SODIUM SERPL-SCNC: 140 MMOL/L (ref 135–145)
T4 FREE SERPL-MCNC: 1.07 NG/DL (ref 0.93–1.7)
TSH SERPL DL<=0.005 MIU/L-ACNC: 1.29 UIU/ML (ref 0.38–5.33)
WBC # BLD AUTO: 12.3 K/UL (ref 4.8–10.8)

## 2023-03-23 PROCEDURE — 80053 COMPREHEN METABOLIC PANEL: CPT

## 2023-03-23 PROCEDURE — 85025 COMPLETE CBC W/AUTO DIFF WBC: CPT

## 2023-03-23 PROCEDURE — 82024 ASSAY OF ACTH: CPT

## 2023-03-23 PROCEDURE — 96413 CHEMO IV INFUSION 1 HR: CPT

## 2023-03-23 PROCEDURE — 700111 HCHG RX REV CODE 636 W/ 250 OVERRIDE (IP): Mod: JG | Performed by: INTERNAL MEDICINE

## 2023-03-23 PROCEDURE — 700105 HCHG RX REV CODE 258: Performed by: INTERNAL MEDICINE

## 2023-03-23 PROCEDURE — 82533 TOTAL CORTISOL: CPT

## 2023-03-23 PROCEDURE — 84439 ASSAY OF FREE THYROXINE: CPT

## 2023-03-23 PROCEDURE — 84443 ASSAY THYROID STIM HORMONE: CPT

## 2023-03-23 PROCEDURE — 96417 CHEMO IV INFUS EACH ADDL SEQ: CPT

## 2023-03-23 RX ORDER — HYDROCODONE BITARTRATE AND ACETAMINOPHEN 5; 325 MG/1; MG/1
1 TABLET ORAL 3 TIMES DAILY PRN
COMMUNITY
Start: 2023-03-15

## 2023-03-23 RX ADMIN — SODIUM CHLORIDE 85 MG: 9 INJECTION, SOLUTION INTRAVENOUS at 14:02

## 2023-03-23 RX ADMIN — SODIUM CHLORIDE 240 MG: 9 INJECTION, SOLUTION INTRAVENOUS at 13:24

## 2023-03-23 NOTE — PROGRESS NOTES
Chemotherapy Verification - PRIMARY RN    C1 D1    Height = 172 cm  Weight = 89 kg  BSA = 2.06 m^2       Medication: Nivolumab (Opdivo)  Dose: 3 mg/kg  Calculated Dose: 267 mg (rounded to vial size-per pharmacy protocol)                            (In mg/m2, AUC, mg/kg)     Medication: Ipilimumab (Yervoy)  Dose: 1 mg/kg  Calculated Dose: 89 mg                             (In mg/m2, AUC, mg/kg)    I confirm this process was performed independently with the BSA and all final chemotherapy dosing calculations congruent.  Any discrepancies of 10% or greater have been addressed with the chemotherapy pharmacist. The resolution of the discrepancy has been documented in the EPIC progress notes.

## 2023-03-23 NOTE — PROGRESS NOTES
Pt arrives to [facility] for cycle 1 of Opdivo/Yervoy.  Pt is accompanied by his sister.  Discussed plan of care with pt.  Pt denies s/sx of infection.  PIV established to L-AC and labs drawn.  Labs reviewed and results meet parameters for treatment.   Opdivo infused without adverse reaction.  Yervoy infused and tolerated well.  PIV flushed with NS and site was removed intact.  Site covered with gauze/coban.  Confirmed next appt on 4/13/23 with pt.  Pt dc home with his sister.

## 2023-03-23 NOTE — PROGRESS NOTES
Chemotherapy Verification - SECONDARY RN       Height = 172 cm  Weight = 89 kg  BSA = 2.06 m2       Medication: Opdivo  Dose: 3 mg/kg  Calculated Dose: 267 mg                             (In mg/m2, AUC, mg/kg)     Medication: Yervoy  Dose: 1 mg/kg  Calculated Dose: 89 kg                             (In mg/m2, AUC, mg/kg)      I confirm that this process was performed independently.

## 2023-03-24 ENCOUNTER — PATIENT OUTREACH (OUTPATIENT)
Dept: ONCOLOGY | Facility: MEDICAL CENTER | Age: 68
End: 2023-03-24
Payer: MEDICARE

## 2023-03-24 DIAGNOSIS — Z65.8 PSYCHOSOCIAL DISTRESS: ICD-10-CM

## 2023-03-24 LAB — ACTH PLAS-MCNC: 19.6 PG/ML (ref 7.2–63.3)

## 2023-03-28 NOTE — PROGRESS NOTES
"Oncology Community Healthcare Specialist Intake    Diagnosis Type: \"unsure\"  Preferred Language:English  Insurance: Medicare  Dental Insurance:No; Last Appointment Date: \"uses dentures\"  Primary Care Provider Reviewed: yes  Last Appointment Date: \"a week ago with Yessi Hurtadoden\"  Introduced Polo Johnson to Oncology Support Services and provided contact information on 3/28/2023 .  Patient Care Team: Dr. Gallagher at cancer Care Specialist   Current Barriers Identified Include: none at this time  MyChart Status: declined.  Communication Preferences: Phone calls; Best Contact Number:[phone]   Patient Contact's Reviewed: yes     Outbound call to pt. For KALYN intake. Pt. States his family is his support system. Educated pt. On [employer] web site for Cancer Support Groups, reviewed the resource center, and informed pt. That I would mail KALYN resource folder including RON Vega's card and BHARATH Juan's card for contact information. Administered distress screening, patient scared a \"0\" stating \" I am good\". No further needs at this time.     Outcome:  No further needs at this time.         "

## 2023-04-04 ENCOUNTER — PATIENT OUTREACH (OUTPATIENT)
Dept: ONCOLOGY | Facility: MEDICAL CENTER | Age: 68
End: 2023-04-04
Payer: MEDICARE

## 2023-04-04 NOTE — LETTER
Shay LAZCANO [facility]    [address]  [place] [address]  Phone: [phone] - Fax: [phone]              Polo Johnson,  [address] [place] [address] [address]  [place] [address]     Date: 04/04/23    Dear Polo,    I am a Cancer Nurse Navigator, a certified oncology nurse. My role is to assess any needs you may have with education, guidance and support. I am available to you and your family through your treatment at [facility].       I am available to address your needs during your journey with the following services:     Care Coordination  I can assist you in facilitating communication between your cancer care treatment team to ensure timely treatment and follow-up.  I can also assist with transition of care back to your primary care provider, or other specialist, as needed.  My goal is to bridge gaps for you throughout the course of your active treatment.       Education Services  Understanding the recommended treatment course by your physician is key. I can provide educational resources personalized to your cancer diagnosis to help you understand your diagnosis and treatment. Please let me know if you would like to receive information about your diagnosis and treatment plan.  I am here to help.     Support Services/Resource Information  [facility] we offer a full scope of support services.  I can assist you with referral information to:  Cancer Clinical Trials & Research  Nutrition counseling  Support groups  Complementary Therapies such as Mind-Body Techniques Meditation  Patient Financial Advocates    [facility] Hollow[facility], an American [employer] affiliate office, our volunteers can assist you with accessing our [employer] library, support services information, head coverings and comfort items  Community and national resources, included eligibility based alexander assistance and pharmaceutical access programs, if you are in need of additional information.      [facility] offers services that include:  Behavioral Health  Genetic counseling & testing  Acupuncture  Lymphedema prevention/treatment program  Palliative care services.        I hope you have an excellent patient experience.  Please feel free to share with me your comments regarding the care you have received- we value your feedback.    Sincerely,     Hilary Mauro R.N.  Cancer Nurse Navigator    Office: [phone]  Main:  [phone]   Email:  [email or web address]

## 2023-04-04 NOTE — PROGRESS NOTES
Call placed to patient.  Introduced self and reviewed role of nurse navigator.  He reports does not currently have any questions or barriers to care.  He had spoken with Mayte, Oncology community healthcare specialist last month.  She had also provided information and resource for patient if needed.  Pt thanked navigator.  Will send letter with my contact information to patient.  Available as needed.

## 2023-04-08 NOTE — PROGRESS NOTES
"Pharmacy Chemotherapy Calculation:    Dx: renal cell carcinoma         Protocol: Nivolumab + Ipilimumab followed by Nivolumab     *Dosing Reference*  Nivolumab 3 mg/kg IV over 30 min on Day 1 followed by  Ipilimumab 1 mg/kg IV over 30 min on Day 1   21-day cycle for 4 cycles  ~followed by~  Nivolumab 240 or 480 mg IV over 30 min on Day 1  14- or 28-day cycle until disease progression or unacceptable toxicity  NCCN Guidelines for Kidney Cancer V.3.2023  Issac FRAIRE, et al. J Clin Oncol. 2017;35(34):5770-6214    Allergies:  Patient has no known allergies.     /82   Pulse 87   Temp 36.3 °C (97.4 °F) (Temporal)   Resp 16   Ht 1.72 m (5' 7.72\")   Wt 90 kg (198 lb 6.6 oz)   SpO2 95%   BMI 30.42 kg/m²  Body surface area is 2.07 meters squared.    All labs (4/13/23) and thyroid panel within treatment plan parameters.       Drug Order   (Drug name, dose, route, IV Fluid & volume, frequency, number of doses) Cycle 2    Previous treatment: none     Medication = Nivolumab (Opdivo)  Base Dose = 3 mg/kg  Calc Dose: Base Dose x 90 kg = 270 mg  Final Dose = 240 mg  Route = IV  Fluid & Volume =  mL  Admin Duration = Over 30 min          Rounded down to vial size (within 10% based on treatment weight) per dose rounding protocol   Medication = Ipilimumab (Yervoy)  Base Dose = 1 mg/kg  Calc Dose: Base Dose x 90 kg = 90 mg  Final Dose = 85 mg  Route = IV  Fluid & Volume = NS 50 mL  Admin Duration = Over 30 min          <10% difference, okay to treat with final dose (unable to round to vial size as it would exceed 10% difference)     By my signature below, I confirm this process was performed independently with the BSA and all final chemotherapy dosing calculations congruent. I have reviewed the above chemotherapy order and that my calculation of the final dose and BSA (when applicable) corroborate those calculations of the  pharmacist. Discrepancies of 10% or greater in the written dose have been addressed " and documented within the EPIC Progress notes.      Marsha Soto, PharmD

## 2023-04-13 ENCOUNTER — OUTPATIENT INFUSION SERVICES (OUTPATIENT)
Dept: ONCOLOGY | Facility: MEDICAL CENTER | Age: 68
End: 2023-04-13
Attending: INTERNAL MEDICINE
Payer: MEDICARE

## 2023-04-13 VITALS
TEMPERATURE: 97.4 F | RESPIRATION RATE: 16 BRPM | BODY MASS INDEX: 30.07 KG/M2 | SYSTOLIC BLOOD PRESSURE: 134 MMHG | OXYGEN SATURATION: 95 % | HEART RATE: 87 BPM | HEIGHT: 68 IN | DIASTOLIC BLOOD PRESSURE: 82 MMHG | WEIGHT: 198.41 LBS

## 2023-04-13 DIAGNOSIS — C64.9 RENAL CELL CARCINOMA, UNSPECIFIED LATERALITY (HCC): ICD-10-CM

## 2023-04-13 LAB
ALBUMIN SERPL BCP-MCNC: 4.4 G/DL (ref 3.2–4.9)
ALBUMIN/GLOB SERPL: 1.2 G/DL
ALP SERPL-CCNC: 121 U/L (ref 30–99)
ALT SERPL-CCNC: 18 U/L (ref 2–50)
ANION GAP SERPL CALC-SCNC: 10 MMOL/L (ref 7–16)
AST SERPL-CCNC: 19 U/L (ref 12–45)
BASOPHILS # BLD AUTO: 0.7 % (ref 0–1.8)
BASOPHILS # BLD: 0.09 K/UL (ref 0–0.12)
BILIRUB SERPL-MCNC: 0.6 MG/DL (ref 0.1–1.5)
BUN SERPL-MCNC: 17 MG/DL (ref 8–22)
CALCIUM ALBUM COR SERPL-MCNC: 9.7 MG/DL (ref 8.5–10.5)
CALCIUM SERPL-MCNC: 10 MG/DL (ref 8.5–10.5)
CHLORIDE SERPL-SCNC: 99 MMOL/L (ref 96–112)
CO2 SERPL-SCNC: 28 MMOL/L (ref 20–33)
CORTIS SERPL-MCNC: 10.3 UG/DL (ref 0–23)
CREAT SERPL-MCNC: 1.15 MG/DL (ref 0.5–1.4)
EOSINOPHIL # BLD AUTO: 0.2 K/UL (ref 0–0.51)
EOSINOPHIL NFR BLD: 1.5 % (ref 0–6.9)
ERYTHROCYTE [DISTWIDTH] IN BLOOD BY AUTOMATED COUNT: 49 FL (ref 35.9–50)
GFR SERPLBLD CREATININE-BSD FMLA CKD-EPI: 69 ML/MIN/1.73 M 2
GLOBULIN SER CALC-MCNC: 3.6 G/DL (ref 1.9–3.5)
GLUCOSE SERPL-MCNC: 100 MG/DL (ref 65–99)
HCT VFR BLD AUTO: 51.7 % (ref 42–52)
HGB BLD-MCNC: 16.5 G/DL (ref 14–18)
IMM GRANULOCYTES # BLD AUTO: 0.07 K/UL (ref 0–0.11)
IMM GRANULOCYTES NFR BLD AUTO: 0.5 % (ref 0–0.9)
LYMPHOCYTES # BLD AUTO: 3.22 K/UL (ref 1–4.8)
LYMPHOCYTES NFR BLD: 23.9 % (ref 22–41)
MCH RBC QN AUTO: 29.2 PG (ref 27–33)
MCHC RBC AUTO-ENTMCNC: 31.9 G/DL (ref 33.7–35.3)
MCV RBC AUTO: 91.3 FL (ref 81.4–97.8)
MONOCYTES # BLD AUTO: 1.17 K/UL (ref 0–0.85)
MONOCYTES NFR BLD AUTO: 8.7 % (ref 0–13.4)
NEUTROPHILS # BLD AUTO: 8.72 K/UL (ref 1.82–7.42)
NEUTROPHILS NFR BLD: 64.7 % (ref 44–72)
NRBC # BLD AUTO: 0 K/UL
NRBC BLD-RTO: 0 /100 WBC
OUTPT INFUS CBC COMMENT OICOM: ABNORMAL
PLATELET # BLD AUTO: 302 K/UL (ref 164–446)
PMV BLD AUTO: 10 FL (ref 9–12.9)
POTASSIUM SERPL-SCNC: 4.2 MMOL/L (ref 3.6–5.5)
PROT SERPL-MCNC: 8 G/DL (ref 6–8.2)
RBC # BLD AUTO: 5.66 M/UL (ref 4.7–6.1)
SODIUM SERPL-SCNC: 137 MMOL/L (ref 135–145)
T4 FREE SERPL-MCNC: 1.03 NG/DL (ref 0.93–1.7)
TSH SERPL DL<=0.005 MIU/L-ACNC: 1.26 UIU/ML (ref 0.38–5.33)
WBC # BLD AUTO: 13.5 K/UL (ref 4.8–10.8)

## 2023-04-13 PROCEDURE — 96417 CHEMO IV INFUS EACH ADDL SEQ: CPT

## 2023-04-13 PROCEDURE — 700111 HCHG RX REV CODE 636 W/ 250 OVERRIDE (IP): Mod: JG | Performed by: INTERNAL MEDICINE

## 2023-04-13 PROCEDURE — 82533 TOTAL CORTISOL: CPT

## 2023-04-13 PROCEDURE — 96413 CHEMO IV INFUSION 1 HR: CPT

## 2023-04-13 PROCEDURE — 700105 HCHG RX REV CODE 258: Performed by: INTERNAL MEDICINE

## 2023-04-13 PROCEDURE — 84443 ASSAY THYROID STIM HORMONE: CPT

## 2023-04-13 PROCEDURE — 80053 COMPREHEN METABOLIC PANEL: CPT

## 2023-04-13 PROCEDURE — 85025 COMPLETE CBC W/AUTO DIFF WBC: CPT

## 2023-04-13 PROCEDURE — 82024 ASSAY OF ACTH: CPT

## 2023-04-13 PROCEDURE — 84439 ASSAY OF FREE THYROXINE: CPT

## 2023-04-13 RX ORDER — 0.9 % SODIUM CHLORIDE 0.9 %
10 VIAL (ML) INJECTION PRN
Status: CANCELLED | OUTPATIENT
Start: 2023-04-13

## 2023-04-13 RX ORDER — EPINEPHRINE 1 MG/ML(1)
0.5 AMPUL (ML) INJECTION PRN
Status: CANCELLED | OUTPATIENT
Start: 2023-04-13

## 2023-04-13 RX ORDER — ONDANSETRON 2 MG/ML
4 INJECTION INTRAMUSCULAR; INTRAVENOUS PRN
Status: CANCELLED | OUTPATIENT
Start: 2023-04-13

## 2023-04-13 RX ORDER — HEPARIN SODIUM (PORCINE) LOCK FLUSH IV SOLN 100 UNIT/ML 100 UNIT/ML
500 SOLUTION INTRAVENOUS PRN
Status: CANCELLED | OUTPATIENT
Start: 2023-04-13

## 2023-04-13 RX ORDER — 0.9 % SODIUM CHLORIDE 0.9 %
VIAL (ML) INJECTION PRN
Status: CANCELLED | OUTPATIENT
Start: 2023-04-13

## 2023-04-13 RX ORDER — DIPHENHYDRAMINE HYDROCHLORIDE 50 MG/ML
50 INJECTION INTRAMUSCULAR; INTRAVENOUS PRN
Status: CANCELLED | OUTPATIENT
Start: 2023-04-13

## 2023-04-13 RX ORDER — ONDANSETRON 8 MG/1
8 TABLET, ORALLY DISINTEGRATING ORAL PRN
Status: CANCELLED | OUTPATIENT
Start: 2023-04-13

## 2023-04-13 RX ORDER — 0.9 % SODIUM CHLORIDE 0.9 %
3 VIAL (ML) INJECTION PRN
Status: CANCELLED | OUTPATIENT
Start: 2023-04-13

## 2023-04-13 RX ORDER — METHYLPREDNISOLONE SODIUM SUCCINATE 125 MG/2ML
125 INJECTION, POWDER, LYOPHILIZED, FOR SOLUTION INTRAMUSCULAR; INTRAVENOUS PRN
Status: CANCELLED | OUTPATIENT
Start: 2023-04-13

## 2023-04-13 RX ORDER — PROCHLORPERAZINE MALEATE 10 MG
10 TABLET ORAL EVERY 6 HOURS PRN
Status: CANCELLED | OUTPATIENT
Start: 2023-04-13

## 2023-04-13 RX ORDER — SODIUM CHLORIDE 9 MG/ML
INJECTION, SOLUTION INTRAVENOUS CONTINUOUS
Status: CANCELLED | OUTPATIENT
Start: 2023-04-13

## 2023-04-13 RX ADMIN — SODIUM CHLORIDE 85 MG: 9 INJECTION, SOLUTION INTRAVENOUS at 12:43

## 2023-04-13 RX ADMIN — SODIUM CHLORIDE 240 MG: 9 INJECTION, SOLUTION INTRAVENOUS at 12:05

## 2023-04-13 ASSESSMENT — FIBROSIS 4 INDEX: FIB4 SCORE: 1.08

## 2023-04-13 NOTE — PROGRESS NOTES
Chemotherapy Verification - SECONDARY RN       Height = 172cm  Weight = 90kg  BSA = 2.07m^2       Medication: nivolumab  Dose: 3mg/kg  Calculated Dose: 270mg                                Medication: ipilimumab  Dose: 1mg/kg  Calculated Dose: 90mg                                  I confirm that this process was performed independently.

## 2023-04-13 NOTE — PROGRESS NOTES
Chemotherapy Verification - PRIMARY RN      Height = 172 cm  Weight = 90 kg  BSA = 2.07 m^2       Medication: Opdivo  Dose: 3 mg/kg  Calculated Dose: 270 mg                             (In mg/m2, AUC, mg/kg)     Medication: Yervoy  Dose: 1 mg/kg  Calculated Dose: 90 kg                             (In mg/m2, AUC, mg/kg)    I confirm this process was performed independently with the BSA and all final chemotherapy dosing calculations congruent.  Any discrepancies of 10% or greater have been addressed with the chemotherapy pharmacist. The resolution of the discrepancy has been documented in the EPIC progress notes.

## 2023-04-13 NOTE — PROGRESS NOTES
Pt arrives to [facility] for cycle 2 of Opdivo/Yervoy.  Pt is accompanied by his daughter today.  Discussed plan of care with pt.  Pt denies s/sx of infection, nausea or pain.  PIV established to L-AC and labs drawn.  Labs reviewed and results meet parameters for treatment.   Opdivo infused without adverse reaction.  Yervoy infused and tolerated well.  PIV flushed with NS and site was removed intact.  Site covered with gauze/coban.  Confirmed next appt on 5/04/23 with pt.  Pt dc home with his daughter.

## 2023-04-13 NOTE — PROGRESS NOTES
"Pharmacy Chemotherapy Verification    Dx: Renal cell cacinoma    Cycle 2  Previous treatment = C1 on 3/23/23  Regimen and Dosing Reference  Nivolumab 3 mg/kg IV over 30 minutes on Day 1 followed by   Ipilimumab 1 mg/kg IV over 30 minutes on Day 1  21 day cycle for 4 cycles followed by   Nivluumab 480 mg IV over 30 minutes on Day 1   28 day cycle until disease progression or unacceptable toxicity  NCCN Guidelines for Kidney Cancer.V.3.2023  Issac HJ, et al. J Clin Oncol. 2017;35(34):5711-4793  Nette TRAN, et al. Lancet Oncol. 2019;20(10):4747-1155  Atabdi MB, et al. J Clin Oncol. 2021;39(15_suppl);4510    Allergies:Patient has no known allergies.  /82   Pulse 87   Temp 36.3 °C (97.4 °F) (Temporal)   Resp 16   Ht 1.72 m (5' 7.72\")   Wt 90 kg (198 lb 6.6 oz)   SpO2 95%   BMI 30.42 kg/m²   Body surface area is 2.07 meters squared.    Labs 4/13/23:  ANC~ 8720 Plt = 302k   Hgb = 16.5     SCr = 1.15 mg/dL CrCl ~ 78 mL/min   AST/ALT/ALK  = 19/18/121 TBili = 0.6   TSH/Free T4 in process    Nivolumab 3 mg/kg x 90 kg = 270 mg   <10% difference, OK to treat with final dose = 240 mg IV    Ipilimumab 1 mg/kg x 90 kg = 90 mg   <10% difference, OK to treat with final dose = 85 mg IV    Dixon López, PharmD        "

## 2023-04-14 LAB — ACTH PLAS-MCNC: 25.7 PG/ML (ref 7.2–63.3)

## 2023-05-03 NOTE — PROGRESS NOTES
"Pharmacy Chemotherapy Verification    Dx: Renal cell cacinoma    Cycle 3  Previous treatment = C2 4/13/23  Regimen and Dosing Reference  Nivolumab 3 mg/kg IV over 30 minutes on Day 1 followed by   Ipilimumab 1 mg/kg IV over 30 minutes on Day 1  21 day cycle for 4 cycles followed by   Nivluumab 480 mg IV over 30 minutes on Day 1   28 day cycle until disease progression or unacceptable toxicity  NCCN Guidelines for Kidney Cancer.V.3.2023  Issac HJ, et al. J Clin Oncol. 2017;35(34):1255-1360  Motlakeshia TRAN, et al. Lancet Oncol. 2019;20(10):8569-6919  Atabdi MB, et al. J Clin Oncol. 2021;39(15_suppl);4510    Allergies:Patient has no known allergies.  /87   Pulse 78   Temp 36.7 °C (98 °F) (Temporal)   Resp 18   Ht 1.72 m (5' 7.72\")   Wt 89 kg (196 lb 3.4 oz)   SpO2 98%   BMI 30.08 kg/m²   Body surface area is 2.06 meters squared.    Labs 5/4/23  ANC~ 7690 Hgb 16.8 Plt 298k  SCr 1.21 CrCl 72.8 mL/min   AST/ALT/AP = 18/17/123 Tbili = 0.5  TSH/Free T4 in process    Nivolumab 3 mg/kg x 89 kg = 267 mg   <10% difference, OK to treat with final dose = 240 mg IV    Ipilimumab 1 mg/kg x 89 kg = 89 mg   <10% difference, OK to treat with final dose = 85 mg IV    Kristine Batista, PharmD, BCOP        "

## 2023-05-04 ENCOUNTER — OUTPATIENT INFUSION SERVICES (OUTPATIENT)
Dept: ONCOLOGY | Facility: MEDICAL CENTER | Age: 68
End: 2023-05-04
Attending: INTERNAL MEDICINE
Payer: MEDICARE

## 2023-05-04 VITALS
SYSTOLIC BLOOD PRESSURE: 130 MMHG | HEART RATE: 78 BPM | WEIGHT: 196.21 LBS | BODY MASS INDEX: 29.74 KG/M2 | DIASTOLIC BLOOD PRESSURE: 87 MMHG | HEIGHT: 68 IN | TEMPERATURE: 98 F | OXYGEN SATURATION: 98 % | RESPIRATION RATE: 18 BRPM

## 2023-05-04 DIAGNOSIS — C64.9 RENAL CELL CARCINOMA, UNSPECIFIED LATERALITY (HCC): ICD-10-CM

## 2023-05-04 LAB
ALBUMIN SERPL BCP-MCNC: 4.8 G/DL (ref 3.2–4.9)
ALBUMIN/GLOB SERPL: 1.4 G/DL
ALP SERPL-CCNC: 123 U/L (ref 30–99)
ALT SERPL-CCNC: 17 U/L (ref 2–50)
ANION GAP SERPL CALC-SCNC: 11 MMOL/L (ref 7–16)
AST SERPL-CCNC: 18 U/L (ref 12–45)
BASOPHILS # BLD AUTO: 0.6 % (ref 0–1.8)
BASOPHILS # BLD: 0.08 K/UL (ref 0–0.12)
BILIRUB SERPL-MCNC: 0.5 MG/DL (ref 0.1–1.5)
BUN SERPL-MCNC: 24 MG/DL (ref 8–22)
CALCIUM ALBUM COR SERPL-MCNC: 9.4 MG/DL (ref 8.5–10.5)
CALCIUM SERPL-MCNC: 10 MG/DL (ref 8.5–10.5)
CHLORIDE SERPL-SCNC: 103 MMOL/L (ref 96–112)
CO2 SERPL-SCNC: 27 MMOL/L (ref 20–33)
CORTIS SERPL-MCNC: 15.8 UG/DL (ref 0–23)
CREAT SERPL-MCNC: 1.21 MG/DL (ref 0.5–1.4)
EOSINOPHIL # BLD AUTO: 0.19 K/UL (ref 0–0.51)
EOSINOPHIL NFR BLD: 1.5 % (ref 0–6.9)
ERYTHROCYTE [DISTWIDTH] IN BLOOD BY AUTOMATED COUNT: 47.8 FL (ref 35.9–50)
GFR SERPLBLD CREATININE-BSD FMLA CKD-EPI: 65 ML/MIN/1.73 M 2
GLOBULIN SER CALC-MCNC: 3.4 G/DL (ref 1.9–3.5)
GLUCOSE SERPL-MCNC: 82 MG/DL (ref 65–99)
HCT VFR BLD AUTO: 52.3 % (ref 42–52)
HGB BLD-MCNC: 16.8 G/DL (ref 14–18)
IMM GRANULOCYTES # BLD AUTO: 0.05 K/UL (ref 0–0.11)
IMM GRANULOCYTES NFR BLD AUTO: 0.4 % (ref 0–0.9)
LYMPHOCYTES # BLD AUTO: 3.51 K/UL (ref 1–4.8)
LYMPHOCYTES NFR BLD: 27.1 % (ref 22–41)
MCH RBC QN AUTO: 28.8 PG (ref 27–33)
MCHC RBC AUTO-ENTMCNC: 32.1 G/DL (ref 33.7–35.3)
MCV RBC AUTO: 89.7 FL (ref 81.4–97.8)
MONOCYTES # BLD AUTO: 1.41 K/UL (ref 0–0.85)
MONOCYTES NFR BLD AUTO: 10.9 % (ref 0–13.4)
NEUTROPHILS # BLD AUTO: 7.69 K/UL (ref 1.82–7.42)
NEUTROPHILS NFR BLD: 59.5 % (ref 44–72)
NRBC # BLD AUTO: 0 K/UL
NRBC BLD-RTO: 0 /100 WBC
OUTPT INFUS CBC COMMENT OICOM: ABNORMAL
PLATELET # BLD AUTO: 298 K/UL (ref 164–446)
PMV BLD AUTO: 10.6 FL (ref 9–12.9)
POTASSIUM SERPL-SCNC: 4.7 MMOL/L (ref 3.6–5.5)
PROT SERPL-MCNC: 8.2 G/DL (ref 6–8.2)
RBC # BLD AUTO: 5.83 M/UL (ref 4.7–6.1)
SODIUM SERPL-SCNC: 141 MMOL/L (ref 135–145)
T4 FREE SERPL-MCNC: 0.96 NG/DL (ref 0.93–1.7)
TSH SERPL DL<=0.005 MIU/L-ACNC: 1.4 UIU/ML (ref 0.38–5.33)
WBC # BLD AUTO: 12.9 K/UL (ref 4.8–10.8)

## 2023-05-04 PROCEDURE — 82024 ASSAY OF ACTH: CPT

## 2023-05-04 PROCEDURE — 85025 COMPLETE CBC W/AUTO DIFF WBC: CPT

## 2023-05-04 PROCEDURE — 80053 COMPREHEN METABOLIC PANEL: CPT

## 2023-05-04 PROCEDURE — 84439 ASSAY OF FREE THYROXINE: CPT

## 2023-05-04 PROCEDURE — 82533 TOTAL CORTISOL: CPT

## 2023-05-04 PROCEDURE — 84443 ASSAY THYROID STIM HORMONE: CPT

## 2023-05-04 PROCEDURE — 700111 HCHG RX REV CODE 636 W/ 250 OVERRIDE (IP): Mod: JW,JG | Performed by: INTERNAL MEDICINE

## 2023-05-04 PROCEDURE — 700105 HCHG RX REV CODE 258: Performed by: INTERNAL MEDICINE

## 2023-05-04 PROCEDURE — 96413 CHEMO IV INFUSION 1 HR: CPT

## 2023-05-04 PROCEDURE — 96417 CHEMO IV INFUS EACH ADDL SEQ: CPT

## 2023-05-04 RX ORDER — PROCHLORPERAZINE MALEATE 10 MG
10 TABLET ORAL EVERY 6 HOURS PRN
Status: CANCELLED | OUTPATIENT
Start: 2023-05-04

## 2023-05-04 RX ORDER — METHYLPREDNISOLONE SODIUM SUCCINATE 125 MG/2ML
125 INJECTION, POWDER, LYOPHILIZED, FOR SOLUTION INTRAMUSCULAR; INTRAVENOUS PRN
Status: CANCELLED | OUTPATIENT
Start: 2023-05-04

## 2023-05-04 RX ORDER — 0.9 % SODIUM CHLORIDE 0.9 %
10 VIAL (ML) INJECTION PRN
Status: CANCELLED | OUTPATIENT
Start: 2023-05-04

## 2023-05-04 RX ORDER — HEPARIN SODIUM (PORCINE) LOCK FLUSH IV SOLN 100 UNIT/ML 100 UNIT/ML
500 SOLUTION INTRAVENOUS PRN
Status: CANCELLED | OUTPATIENT
Start: 2023-05-04

## 2023-05-04 RX ORDER — 0.9 % SODIUM CHLORIDE 0.9 %
3 VIAL (ML) INJECTION PRN
Status: CANCELLED | OUTPATIENT
Start: 2023-05-04

## 2023-05-04 RX ORDER — SODIUM CHLORIDE 9 MG/ML
INJECTION, SOLUTION INTRAVENOUS CONTINUOUS
Status: CANCELLED | OUTPATIENT
Start: 2023-05-04

## 2023-05-04 RX ORDER — EPINEPHRINE 1 MG/ML(1)
0.5 AMPUL (ML) INJECTION PRN
Status: CANCELLED | OUTPATIENT
Start: 2023-05-04

## 2023-05-04 RX ORDER — 0.9 % SODIUM CHLORIDE 0.9 %
VIAL (ML) INJECTION PRN
Status: CANCELLED | OUTPATIENT
Start: 2023-05-04

## 2023-05-04 RX ORDER — ONDANSETRON 8 MG/1
8 TABLET, ORALLY DISINTEGRATING ORAL PRN
Status: CANCELLED | OUTPATIENT
Start: 2023-05-04

## 2023-05-04 RX ORDER — DIPHENHYDRAMINE HYDROCHLORIDE 50 MG/ML
50 INJECTION INTRAMUSCULAR; INTRAVENOUS PRN
Status: CANCELLED | OUTPATIENT
Start: 2023-05-04

## 2023-05-04 RX ORDER — ONDANSETRON 2 MG/ML
4 INJECTION INTRAMUSCULAR; INTRAVENOUS PRN
Status: CANCELLED | OUTPATIENT
Start: 2023-05-04

## 2023-05-04 RX ADMIN — SODIUM CHLORIDE 85 MG: 9 INJECTION, SOLUTION INTRAVENOUS at 17:01

## 2023-05-04 RX ADMIN — SODIUM CHLORIDE 240 MG: 9 INJECTION, SOLUTION INTRAVENOUS at 16:19

## 2023-05-04 ASSESSMENT — FIBROSIS 4 INDEX: FIB4 SCORE: 1.01

## 2023-05-04 NOTE — PROGRESS NOTES
"Pharmacy Chemotherapy Calculation:    Dx: renal cell carcinoma         Protocol: Nivolumab + Ipilimumab followed by Nivolumab     *Dosing Reference*  Nivolumab 3 mg/kg IV over 30 min on Day 1 followed by  Ipilimumab 1 mg/kg IV over 30 min on Day 1   21-day cycle for 4 cycles  ~followed by~  Nivolumab 240 or 480 mg IV over 30 min on Day 1  14- or 28-day cycle until disease progression or unacceptable toxicity  NCCN Guidelines for Kidney Cancer V.3.2023  Issac FRAIRE, et al. J Clin Oncol. 2017;3534):5328-5304    Allergies:  Patient has no known allergies.     /87   Pulse 78   Temp 36.7 °C (98 °F) (Temporal)   Resp 18   Ht 1.72 m (5' 7.72\")   Wt 89 kg (196 lb 3.4 oz)   SpO2 98%   BMI 30.08 kg/m²  Body surface area is 2.06 meters squared.    Labs 5/4/23:  ANC~ 7690 Plt = 298k   Hgb = 16.8     SCr = 1.21 mg/dL CrCl ~ 74 mL/min   AST/ALT/ALK  = 18/17/123 TBili = 0.5  TSH = 1.4 Free T4 = 0.96       Drug Order   (Drug name, dose, route, IV Fluid & volume, frequency, number of doses) Cycle 3  Previous treatment: C2 on 4/13/23     Medication = Nivolumab (Opdivo)  Base Dose = 3 mg/kg  Calc Dose: Base Dose x 89 kg = 267 mg  Final Dose = 240 mg  Route = IV  Fluid & Volume =  mL  Admin Duration = Over 30 min          Rounded down to vial size (within 10% based on treatment weight) per dose rounding protocol   Medication = Ipilimumab (Yervoy)  Base Dose = 1 mg/kg  Calc Dose: Base Dose x 89 kg = 89 mg  Final Dose = 85 mg  Route = IV  Fluid & Volume = NS 50 mL  Admin Duration = Over 30 min          <10% difference, okay to treat with final dose (unable to round to vial size as it would exceed 10% difference)     By my signature below, I confirm this process was performed independently with the BSA and all final chemotherapy dosing calculations congruent. I have reviewed the above chemotherapy order and that my calculation of the final dose and BSA (when applicable) corroborate those calculations of the  " pharmacist. Discrepancies of 10% or greater in the written dose have been addressed and documented within the EPIC Progress notes.      Dixon López, PharmD

## 2023-05-04 NOTE — PROGRESS NOTES
Chemotherapy Verification - SECONDARY RN       Height = 1.72m  Weight = 89kg  BSA = 2.06m2       Medication: nivolumab  Dose: 3mg/kg  Calculated Dose: 267mg                             (In mg/m2, AUC, mg/kg)     Medication: ipilimumab  Dose: 1mg/kg  Calculated Dose: 89mg                             (In mg/m2, AUC, mg/kg)      I confirm that this process was performed independently.

## 2023-05-04 NOTE — PROGRESS NOTES
Chemotherapy Verification - PRIMARY RN      Height = 172 cm  Weight = 89 kg  BSA = 2.06 m^2       Medication: Ipilimumab  Dose: 1 mg/kg  Calculated Dose: 89 mg                             (In mg/m2, AUC, mg/kg)     Medication: Nivolumab  Dose: 3 mg/kg  Calculated Dose: 267 mg                             (In mg/m2, AUC, mg/kg)      I confirm this process was performed independently with the BSA and all final chemotherapy dosing calculations congruent.  Any discrepancies of 10% or greater have been addressed with the chemotherapy pharmacist. The resolution of the discrepancy has been documented in the EPIC progress notes.

## 2023-05-05 NOTE — PROGRESS NOTES
Pt presented to infusion for C3 Opdivo/Yervoy. He denied any s/s of infection or open wounds. PIV started, labs drawn as ordered. Pt met parameters for tx. Opdivo and Yervoy both infused over 30 mins (tubing/filter changed in between). Pt tolerated well. PIV flushed and removed, gauze drsg placed. Has next 2 cycles scheduled (C5 will start single agent Opdivo). Left on foot to self care with daughter.

## 2023-05-06 LAB — ACTH PLAS-MCNC: 32.3 PG/ML (ref 7.2–63.3)

## 2023-05-15 ENCOUNTER — HOSPITAL ENCOUNTER (OUTPATIENT)
Dept: RADIOLOGY | Facility: MEDICAL CENTER | Age: 68
End: 2023-05-15
Attending: INTERNAL MEDICINE
Payer: MEDICARE

## 2023-05-15 DIAGNOSIS — C07 MALIGNANT NEOPLASM OF PAROTID GLAND (HCC): ICD-10-CM

## 2023-05-15 DIAGNOSIS — C80.1 MALIGNANT (PRIMARY) NEOPLASM, UNSPECIFIED (HCC): ICD-10-CM

## 2023-05-15 DIAGNOSIS — C64.1 MALIGNANT NEOPLASM OF RIGHT KIDNEY, EXCEPT RENAL PELVIS (HCC): ICD-10-CM

## 2023-05-15 PROCEDURE — 700117 HCHG RX CONTRAST REV CODE 255: Performed by: INTERNAL MEDICINE

## 2023-05-15 PROCEDURE — 70491 CT SOFT TISSUE NECK W/DYE: CPT

## 2023-05-15 PROCEDURE — 71260 CT THORAX DX C+: CPT

## 2023-05-15 RX ADMIN — IOHEXOL 25 ML: 240 INJECTION, SOLUTION INTRATHECAL; INTRAVASCULAR; INTRAVENOUS; ORAL at 15:44

## 2023-05-15 RX ADMIN — IOHEXOL 100 ML: 350 INJECTION, SOLUTION INTRAVENOUS at 15:38

## 2023-05-25 ENCOUNTER — OUTPATIENT INFUSION SERVICES (OUTPATIENT)
Dept: ONCOLOGY | Facility: MEDICAL CENTER | Age: 68
End: 2023-05-25
Attending: INTERNAL MEDICINE
Payer: MEDICARE

## 2023-05-25 VITALS
TEMPERATURE: 97.7 F | WEIGHT: 197.53 LBS | BODY MASS INDEX: 29.94 KG/M2 | DIASTOLIC BLOOD PRESSURE: 85 MMHG | OXYGEN SATURATION: 93 % | RESPIRATION RATE: 18 BRPM | HEIGHT: 68 IN | HEART RATE: 87 BPM | SYSTOLIC BLOOD PRESSURE: 144 MMHG

## 2023-05-25 DIAGNOSIS — C64.9 RENAL CELL CARCINOMA, UNSPECIFIED LATERALITY (HCC): ICD-10-CM

## 2023-05-25 LAB
ALBUMIN SERPL BCP-MCNC: 4.3 G/DL (ref 3.2–4.9)
ALBUMIN/GLOB SERPL: 1.4 G/DL
ALP SERPL-CCNC: 113 U/L (ref 30–99)
ALT SERPL-CCNC: 19 U/L (ref 2–50)
ANION GAP SERPL CALC-SCNC: 12 MMOL/L (ref 7–16)
AST SERPL-CCNC: 16 U/L (ref 12–45)
BASOPHILS # BLD AUTO: 0.6 % (ref 0–1.8)
BASOPHILS # BLD: 0.08 K/UL (ref 0–0.12)
BILIRUB SERPL-MCNC: 0.6 MG/DL (ref 0.1–1.5)
BUN SERPL-MCNC: 21 MG/DL (ref 8–22)
CALCIUM ALBUM COR SERPL-MCNC: 9.7 MG/DL (ref 8.5–10.5)
CALCIUM SERPL-MCNC: 9.9 MG/DL (ref 8.5–10.5)
CHLORIDE SERPL-SCNC: 106 MMOL/L (ref 96–112)
CO2 SERPL-SCNC: 25 MMOL/L (ref 20–33)
CORTIS SERPL-MCNC: 8.5 UG/DL (ref 0–23)
CREAT SERPL-MCNC: 1.27 MG/DL (ref 0.5–1.4)
EOSINOPHIL # BLD AUTO: 0.23 K/UL (ref 0–0.51)
EOSINOPHIL NFR BLD: 1.9 % (ref 0–6.9)
ERYTHROCYTE [DISTWIDTH] IN BLOOD BY AUTOMATED COUNT: 47.4 FL (ref 35.9–50)
GFR SERPLBLD CREATININE-BSD FMLA CKD-EPI: 61 ML/MIN/1.73 M 2
GLOBULIN SER CALC-MCNC: 3.1 G/DL (ref 1.9–3.5)
GLUCOSE SERPL-MCNC: 86 MG/DL (ref 65–99)
HCT VFR BLD AUTO: 48.3 % (ref 42–52)
HGB BLD-MCNC: 16.1 G/DL (ref 14–18)
IMM GRANULOCYTES # BLD AUTO: 0.05 K/UL (ref 0–0.11)
IMM GRANULOCYTES NFR BLD AUTO: 0.4 % (ref 0–0.9)
LYMPHOCYTES # BLD AUTO: 3.81 K/UL (ref 1–4.8)
LYMPHOCYTES NFR BLD: 30.9 % (ref 22–41)
MCH RBC QN AUTO: 29.8 PG (ref 27–33)
MCHC RBC AUTO-ENTMCNC: 33.3 G/DL (ref 32.3–36.5)
MCV RBC AUTO: 89.3 FL (ref 81.4–97.8)
MONOCYTES # BLD AUTO: 1.28 K/UL (ref 0–0.85)
MONOCYTES NFR BLD AUTO: 10.4 % (ref 0–13.4)
NEUTROPHILS # BLD AUTO: 6.88 K/UL (ref 1.82–7.42)
NEUTROPHILS NFR BLD: 55.8 % (ref 44–72)
NRBC # BLD AUTO: 0 K/UL
NRBC BLD-RTO: 0 /100 WBC (ref 0–0.2)
OUTPT INFUS CBC COMMENT OICOM: ABNORMAL
PLATELET # BLD AUTO: 282 K/UL (ref 164–446)
PMV BLD AUTO: 10.4 FL (ref 9–12.9)
POTASSIUM SERPL-SCNC: 4.2 MMOL/L (ref 3.6–5.5)
PROT SERPL-MCNC: 7.4 G/DL (ref 6–8.2)
RBC # BLD AUTO: 5.41 M/UL (ref 4.7–6.1)
SODIUM SERPL-SCNC: 143 MMOL/L (ref 135–145)
T4 FREE SERPL-MCNC: 0.9 NG/DL (ref 0.93–1.7)
TSH SERPL DL<=0.005 MIU/L-ACNC: 0.6 UIU/ML (ref 0.38–5.33)
WBC # BLD AUTO: 12.3 K/UL (ref 4.8–10.8)

## 2023-05-25 PROCEDURE — 96413 CHEMO IV INFUSION 1 HR: CPT

## 2023-05-25 PROCEDURE — 700111 HCHG RX REV CODE 636 W/ 250 OVERRIDE (IP): Mod: JG | Performed by: INTERNAL MEDICINE

## 2023-05-25 PROCEDURE — 96417 CHEMO IV INFUS EACH ADDL SEQ: CPT

## 2023-05-25 PROCEDURE — 82024 ASSAY OF ACTH: CPT

## 2023-05-25 PROCEDURE — 84439 ASSAY OF FREE THYROXINE: CPT

## 2023-05-25 PROCEDURE — 80053 COMPREHEN METABOLIC PANEL: CPT

## 2023-05-25 PROCEDURE — 700105 HCHG RX REV CODE 258: Performed by: INTERNAL MEDICINE

## 2023-05-25 PROCEDURE — 85025 COMPLETE CBC W/AUTO DIFF WBC: CPT

## 2023-05-25 PROCEDURE — 82533 TOTAL CORTISOL: CPT

## 2023-05-25 PROCEDURE — 84443 ASSAY THYROID STIM HORMONE: CPT

## 2023-05-25 RX ORDER — 0.9 % SODIUM CHLORIDE 0.9 %
10 VIAL (ML) INJECTION PRN
Status: CANCELLED | OUTPATIENT
Start: 2023-05-25

## 2023-05-25 RX ORDER — 0.9 % SODIUM CHLORIDE 0.9 %
3 VIAL (ML) INJECTION PRN
Status: CANCELLED | OUTPATIENT
Start: 2023-05-25

## 2023-05-25 RX ORDER — DIPHENHYDRAMINE HYDROCHLORIDE 50 MG/ML
50 INJECTION INTRAMUSCULAR; INTRAVENOUS PRN
Status: CANCELLED | OUTPATIENT
Start: 2023-05-25

## 2023-05-25 RX ORDER — TRIAMCINOLONE ACETONIDE 1 MG/G
1 CREAM TOPICAL
COMMUNITY
Start: 2023-05-19

## 2023-05-25 RX ORDER — HEPARIN SODIUM (PORCINE) LOCK FLUSH IV SOLN 100 UNIT/ML 100 UNIT/ML
500 SOLUTION INTRAVENOUS PRN
Status: CANCELLED | OUTPATIENT
Start: 2023-05-25

## 2023-05-25 RX ORDER — 0.9 % SODIUM CHLORIDE 0.9 %
VIAL (ML) INJECTION PRN
Status: CANCELLED | OUTPATIENT
Start: 2023-05-25

## 2023-05-25 RX ORDER — ONDANSETRON 2 MG/ML
4 INJECTION INTRAMUSCULAR; INTRAVENOUS PRN
Status: CANCELLED | OUTPATIENT
Start: 2023-05-25

## 2023-05-25 RX ORDER — SODIUM CHLORIDE 9 MG/ML
INJECTION, SOLUTION INTRAVENOUS CONTINUOUS
Status: CANCELLED | OUTPATIENT
Start: 2023-05-25

## 2023-05-25 RX ORDER — METHYLPREDNISOLONE SODIUM SUCCINATE 125 MG/2ML
125 INJECTION, POWDER, LYOPHILIZED, FOR SOLUTION INTRAMUSCULAR; INTRAVENOUS PRN
Status: CANCELLED | OUTPATIENT
Start: 2023-05-25

## 2023-05-25 RX ORDER — PROCHLORPERAZINE MALEATE 10 MG
10 TABLET ORAL EVERY 6 HOURS PRN
Status: CANCELLED | OUTPATIENT
Start: 2023-05-25

## 2023-05-25 RX ORDER — ONDANSETRON 8 MG/1
8 TABLET, ORALLY DISINTEGRATING ORAL PRN
Status: CANCELLED | OUTPATIENT
Start: 2023-05-25

## 2023-05-25 RX ORDER — EPINEPHRINE 1 MG/ML(1)
0.5 AMPUL (ML) INJECTION PRN
Status: CANCELLED | OUTPATIENT
Start: 2023-05-25

## 2023-05-25 RX ADMIN — SODIUM CHLORIDE 240 MG: 9 INJECTION, SOLUTION INTRAVENOUS at 15:24

## 2023-05-25 RX ADMIN — SODIUM CHLORIDE 85 MG: 9 INJECTION, SOLUTION INTRAVENOUS at 16:19

## 2023-05-25 ASSESSMENT — FIBROSIS 4 INDEX: FIB4 SCORE: 1

## 2023-05-25 NOTE — PROGRESS NOTES
Chemotherapy Verification - PRIMARY RN      Height = 172 cm  Weight = 89.6 kg  BSA = 2.07 m^2       Medication: Opdivo  Dose: 3 mg/kg  Calculated Dose: 268.8 mg                             (In mg/m2, AUC, mg/kg)     Medication: Yervoy  Dose: 1 mg/kg  Calculated Dose: 89.6 mg                             (In mg/m2, AUC, mg/kg)        I confirm this process was performed independently with the BSA and all final chemotherapy dosing calculations congruent.  Any discrepancies of 10% or greater have been addressed with the chemotherapy pharmacist. The resolution of the discrepancy has been documented in the EPIC progress notes.

## 2023-05-25 NOTE — PROGRESS NOTES
"Pharmacy Chemotherapy Verification    Dx: Renal cell cacinoma    Cycle 4  Previous treatment = C3 5/4/23  Regimen and Dosing Reference  Nivolumab 3 mg/kg IV over 30 minutes on Day 1 followed by   Ipilimumab 1 mg/kg IV over 30 minutes on Day 1  21 day cycle for 4 cycles followed by   Nivluumab 480 mg IV over 30 minutes on Day 1   28 day cycle until disease progression or unacceptable toxicity  NCCN Guidelines for Kidney Cancer.V.3.2023  sIsac HJ, et al. J Clin Oncol. 2017;35(34):8231-7841  Motlakeshia TRAN, et al. Lancet Oncol. 2019;20(10):6148-3698  Atabdi MB, et al. J Clin Oncol. 2021;39(15_suppl);4510    Allergies:Patient has no known allergies.  BP (!) 144/85   Pulse 87   Temp 36.5 °C (97.7 °F) (Temporal)   Resp 18   Ht 1.72 m (5' 7.72\")   Wt 89.6 kg (197 lb 8.5 oz)   SpO2 93%   BMI 30.29 kg/m²   Body surface area is 2.07 meters squared.    Labs 5/25/23  ANC~6880 Hgb 16.1 Plt 282k  SCr 1.27  CrCl 69.8 mL/min  AST/ALT/AP = 16/19/113 Tbili = 0.6  TSH 0.6 Free T4 = 0.9    Nivolumab 3 mg/kg x 89.6 kg = 268.8 mg   <10% difference, OK to treat with final dose = 240 mg IV    Ipilimumab 1 mg/kg x 89.6 kg = 89.6 mg   <10% difference, OK to treat with final dose = 85 mg IV    Kristine Batista, PharmD, BCOP      "

## 2023-05-25 NOTE — PROGRESS NOTES
Chemotherapy Verification - SECONDARY RN       Height = 172 cm  Weight = 89.6 kg  BSA = 2.07 m2       Medication: Opdivo  Dose: 3 mg/kg  Calculated Dose: 268.8 mg                             (In mg/m2, AUC, mg/kg)     Medication: Yervoy  Dose: 1 mg/kg  Calculated Dose: 89.6 mg                             (In mg/m2, AUC, mg/kg)      I confirm that this process was performed independently.    Topical Steroids Counseling: I discussed with the patient that prolonged use of topical steroids can result in the increased appearance of superficial blood vessels (telangiectasias), lightening (hypopigmentation) and thinning of the skin (atrophy).  Patient understands to avoid using high potency steroids in skin folds, the groin or the face.  The patient verbalized understanding of the proper use and possible adverse effects of topical steroids.  All of the patient's questions and concerns were addressed.

## 2023-05-25 NOTE — PROGRESS NOTES
"Pharmacy Chemotherapy Calculation:    Dx: renal cell carcinoma         Protocol: Nivolumab + Ipilimumab followed by Nivolumab     *Dosing Reference*  Nivolumab 3 mg/kg IV over 30 min on Day 1 followed by  Ipilimumab 1 mg/kg IV over 30 min on Day 1   21-day cycle for 4 cycles  ~followed by~  Nivolumab 240 or 480 mg IV over 30 min on Day 1  14- or 28-day cycle until disease progression or unacceptable toxicity  NCCN Guidelines for Kidney Cancer V.3.2023  Issac FRAIRE, et al. J Clin Oncol. 2017;35(34):0045-1386    Allergies:  Patient has no known allergies.     BP (!) 144/85   Pulse 87   Temp 36.5 °C (97.7 °F) (Temporal)   Resp 18   Ht 1.72 m (5' 7.72\")   Wt 89.6 kg (197 lb 8.5 oz)   SpO2 93%   BMI 30.29 kg/m²  Body surface area is 2.07 meters squared.    Labs 5/25/23:  ANC~ 6880 Plt = 282k   Hgb = 16.1     SCr = 1.27 mg/dL CrCl ~ 71 mL/min   AST/ALT/ALK  = 16/19/113 TBili = 0.6   TSH = 0.6 Free T4 = 0.9     Drug Order   (Drug name, dose, route, IV Fluid & volume, frequency, number of doses) Cycle 4  Previous treatment: C3 on 5/4/23     Medication = Nivolumab (Opdivo)  Base Dose = 3 mg/kg  Calc Dose: Base Dose x 89.6 kg = 268.8 mg  Final Dose = 240 mg  Route = IV  Fluid & Volume =  mL  Admin Duration = Over 30 min          Rounded down to vial size (within 10% based on treatment weight) per dose rounding protocol   Medication = Ipilimumab (Yervoy)  Base Dose = 1 mg/kg  Calc Dose: Base Dose x 89.6 kg = 89.6 mg  Final Dose = 85 mg  Route = IV  Fluid & Volume = NS 50 mL  Admin Duration = Over 30 min          <10% difference, okay to treat with final dose (unable to round to vial size as it would exceed 10% difference)     By my signature below, I confirm this process was performed independently with the BSA and all final chemotherapy dosing calculations congruent. I have reviewed the above chemotherapy order and that my calculation of the final dose and BSA (when applicable) corroborate those calculations of " the  pharmacist. Discrepancies of 10% or greater in the written dose have been addressed and documented within the EPIC Progress notes.      Dixon López, PharmD

## 2023-05-26 NOTE — PROGRESS NOTES
Pt presented to infusion for C4 Opdivo/Yervoy. He denied any s/s of infection or open wounds. PIV started, labs drawn as ordered. Pt met parameters for tx. Opdivo and Yervoy both infused over 30 mins (tubing/filter changed in between). Pt tolerated well. PIV flushed and removed, gauze drsg placed. Has next cycle scheduled (C5 will start single agent Opdivo). Left on foot to self care.

## 2023-05-27 LAB — ACTH PLAS-MCNC: 40.6 PG/ML (ref 7.2–63.3)

## 2023-06-14 RX ORDER — HEPARIN SODIUM (PORCINE) LOCK FLUSH IV SOLN 100 UNIT/ML 100 UNIT/ML
500 SOLUTION INTRAVENOUS PRN
Status: CANCELLED | OUTPATIENT
Start: 2023-06-14

## 2023-06-14 RX ORDER — 0.9 % SODIUM CHLORIDE 0.9 %
10 VIAL (ML) INJECTION PRN
Status: CANCELLED | OUTPATIENT
Start: 2023-06-14

## 2023-06-14 RX ORDER — 0.9 % SODIUM CHLORIDE 0.9 %
10 VIAL (ML) INJECTION PRN
Status: CANCELLED | OUTPATIENT
Start: 2023-06-15

## 2023-06-14 RX ORDER — HEPARIN SODIUM (PORCINE) LOCK FLUSH IV SOLN 100 UNIT/ML 100 UNIT/ML
500 SOLUTION INTRAVENOUS PRN
Status: CANCELLED | OUTPATIENT
Start: 2023-06-15

## 2023-06-14 RX ORDER — METHYLPREDNISOLONE SODIUM SUCCINATE 125 MG/2ML
125 INJECTION, POWDER, LYOPHILIZED, FOR SOLUTION INTRAMUSCULAR; INTRAVENOUS PRN
Status: CANCELLED | OUTPATIENT
Start: 2023-06-15

## 2023-06-14 RX ORDER — ONDANSETRON 2 MG/ML
4 INJECTION INTRAMUSCULAR; INTRAVENOUS PRN
Status: CANCELLED | OUTPATIENT
Start: 2023-06-15

## 2023-06-14 RX ORDER — 0.9 % SODIUM CHLORIDE 0.9 %
3 VIAL (ML) INJECTION PRN
Status: CANCELLED | OUTPATIENT
Start: 2023-06-15

## 2023-06-14 RX ORDER — ONDANSETRON 8 MG/1
8 TABLET, ORALLY DISINTEGRATING ORAL PRN
Status: CANCELLED | OUTPATIENT
Start: 2023-06-15

## 2023-06-14 RX ORDER — 0.9 % SODIUM CHLORIDE 0.9 %
VIAL (ML) INJECTION PRN
Status: CANCELLED | OUTPATIENT
Start: 2023-06-14

## 2023-06-14 RX ORDER — EPINEPHRINE 1 MG/ML(1)
0.5 AMPUL (ML) INJECTION PRN
Status: CANCELLED | OUTPATIENT
Start: 2023-06-15

## 2023-06-14 RX ORDER — DIPHENHYDRAMINE HYDROCHLORIDE 50 MG/ML
50 INJECTION INTRAMUSCULAR; INTRAVENOUS PRN
Status: CANCELLED | OUTPATIENT
Start: 2023-06-15

## 2023-06-14 RX ORDER — 0.9 % SODIUM CHLORIDE 0.9 %
VIAL (ML) INJECTION PRN
Status: CANCELLED | OUTPATIENT
Start: 2023-06-15

## 2023-06-14 RX ORDER — SODIUM CHLORIDE 9 MG/ML
INJECTION, SOLUTION INTRAVENOUS CONTINUOUS
Status: CANCELLED | OUTPATIENT
Start: 2023-06-15

## 2023-06-14 RX ORDER — 0.9 % SODIUM CHLORIDE 0.9 %
3 VIAL (ML) INJECTION PRN
Status: CANCELLED | OUTPATIENT
Start: 2023-06-14

## 2023-06-14 RX ORDER — PROCHLORPERAZINE MALEATE 10 MG
10 TABLET ORAL EVERY 6 HOURS PRN
Status: CANCELLED | OUTPATIENT
Start: 2023-06-15

## 2023-06-15 ENCOUNTER — OUTPATIENT INFUSION SERVICES (OUTPATIENT)
Dept: ONCOLOGY | Facility: MEDICAL CENTER | Age: 68
End: 2023-06-15
Attending: INTERNAL MEDICINE
Payer: MEDICARE

## 2023-06-15 VITALS
BODY MASS INDEX: 29.77 KG/M2 | RESPIRATION RATE: 18 BRPM | WEIGHT: 196.43 LBS | DIASTOLIC BLOOD PRESSURE: 82 MMHG | HEIGHT: 68 IN | SYSTOLIC BLOOD PRESSURE: 153 MMHG | HEART RATE: 83 BPM | TEMPERATURE: 98 F | OXYGEN SATURATION: 95 %

## 2023-06-15 DIAGNOSIS — C64.9 RENAL CELL CARCINOMA, UNSPECIFIED LATERALITY (HCC): ICD-10-CM

## 2023-06-15 LAB
ALBUMIN SERPL BCP-MCNC: 4.6 G/DL (ref 3.2–4.9)
ALBUMIN/GLOB SERPL: 1.6 G/DL
ALP SERPL-CCNC: 110 U/L (ref 30–99)
ALT SERPL-CCNC: 15 U/L (ref 2–50)
ANION GAP SERPL CALC-SCNC: 12 MMOL/L (ref 7–16)
AST SERPL-CCNC: 17 U/L (ref 12–45)
BASOPHILS # BLD AUTO: 0.8 % (ref 0–1.8)
BASOPHILS # BLD: 0.09 K/UL (ref 0–0.12)
BILIRUB SERPL-MCNC: 0.7 MG/DL (ref 0.1–1.5)
BUN SERPL-MCNC: 23 MG/DL (ref 8–22)
CALCIUM ALBUM COR SERPL-MCNC: 9 MG/DL (ref 8.5–10.5)
CALCIUM SERPL-MCNC: 9.5 MG/DL (ref 8.5–10.5)
CHLORIDE SERPL-SCNC: 103 MMOL/L (ref 96–112)
CO2 SERPL-SCNC: 24 MMOL/L (ref 20–33)
CORTIS SERPL-MCNC: 7.6 UG/DL (ref 0–23)
CREAT SERPL-MCNC: 1.56 MG/DL (ref 0.5–1.4)
EOSINOPHIL # BLD AUTO: 0.43 K/UL (ref 0–0.51)
EOSINOPHIL NFR BLD: 3.6 % (ref 0–6.9)
ERYTHROCYTE [DISTWIDTH] IN BLOOD BY AUTOMATED COUNT: 48.5 FL (ref 35.9–50)
GFR SERPLBLD CREATININE-BSD FMLA CKD-EPI: 48 ML/MIN/1.73 M 2
GLOBULIN SER CALC-MCNC: 2.9 G/DL (ref 1.9–3.5)
GLUCOSE SERPL-MCNC: 95 MG/DL (ref 65–99)
HCT VFR BLD AUTO: 47.3 % (ref 42–52)
HGB BLD-MCNC: 15.7 G/DL (ref 14–18)
IMM GRANULOCYTES # BLD AUTO: 0.05 K/UL (ref 0–0.11)
IMM GRANULOCYTES NFR BLD AUTO: 0.4 % (ref 0–0.9)
LYMPHOCYTES # BLD AUTO: 4.1 K/UL (ref 1–4.8)
LYMPHOCYTES NFR BLD: 34.2 % (ref 22–41)
MCH RBC QN AUTO: 29.1 PG (ref 27–33)
MCHC RBC AUTO-ENTMCNC: 33.2 G/DL (ref 32.3–36.5)
MCV RBC AUTO: 87.6 FL (ref 81.4–97.8)
MONOCYTES # BLD AUTO: 1.08 K/UL (ref 0–0.85)
MONOCYTES NFR BLD AUTO: 9 % (ref 0–13.4)
NEUTROPHILS # BLD AUTO: 6.24 K/UL (ref 1.82–7.42)
NEUTROPHILS NFR BLD: 52 % (ref 44–72)
NRBC # BLD AUTO: 0 K/UL
NRBC BLD-RTO: 0 /100 WBC (ref 0–0.2)
OUTPT INFUS CBC COMMENT OICOM: ABNORMAL
PLATELET # BLD AUTO: 316 K/UL (ref 164–446)
PMV BLD AUTO: 10.6 FL (ref 9–12.9)
POTASSIUM SERPL-SCNC: 4.1 MMOL/L (ref 3.6–5.5)
PROT SERPL-MCNC: 7.5 G/DL (ref 6–8.2)
RBC # BLD AUTO: 5.4 M/UL (ref 4.7–6.1)
SODIUM SERPL-SCNC: 139 MMOL/L (ref 135–145)
T4 FREE SERPL-MCNC: 0.93 NG/DL (ref 0.93–1.7)
TSH SERPL DL<=0.005 MIU/L-ACNC: 0.79 UIU/ML (ref 0.38–5.33)
WBC # BLD AUTO: 12 K/UL (ref 4.8–10.8)

## 2023-06-15 PROCEDURE — 700105 HCHG RX REV CODE 258: Performed by: INTERNAL MEDICINE

## 2023-06-15 PROCEDURE — 82533 TOTAL CORTISOL: CPT

## 2023-06-15 PROCEDURE — 84439 ASSAY OF FREE THYROXINE: CPT

## 2023-06-15 PROCEDURE — 80053 COMPREHEN METABOLIC PANEL: CPT

## 2023-06-15 PROCEDURE — 84443 ASSAY THYROID STIM HORMONE: CPT

## 2023-06-15 PROCEDURE — 700111 HCHG RX REV CODE 636 W/ 250 OVERRIDE (IP): Mod: JG | Performed by: INTERNAL MEDICINE

## 2023-06-15 PROCEDURE — 85025 COMPLETE CBC W/AUTO DIFF WBC: CPT

## 2023-06-15 PROCEDURE — 96413 CHEMO IV INFUSION 1 HR: CPT

## 2023-06-15 RX ADMIN — SODIUM CHLORIDE 480 MG: 9 INJECTION, SOLUTION INTRAVENOUS at 15:01

## 2023-06-15 ASSESSMENT — FIBROSIS 4 INDEX: FIB4 SCORE: 0.89

## 2023-06-15 NOTE — PROGRESS NOTES
Chemotherapy Verification - SECONDARY RN       Height = 172 cm  Weight = 89.1 kg  BSA = 2.06 m2       Medication: Opdivo  Dose: 480 mg set dose  Calculated Dose: 480 mg set dose                             (In mg/m2, AUC, mg/kg)         I confirm that this process was performed independently.

## 2023-06-15 NOTE — PROGRESS NOTES
Chemotherapy Verification - PRIMARY RN      Height = 172 cm  Weight = 89.1 kg  BSA = 2.06 m^2       Medication: nivolumab (Opdivo)  Dose: 480 mg (set dose)  Calculated Dose: 480 mg (set dose)                             (In mg/m2, AUC, mg/kg)     I confirm this process was performed independently with the BSA and all final chemotherapy dosing calculations congruent.  Any discrepancies of 10% or greater have been addressed with the chemotherapy pharmacist. The resolution of the discrepancy has been documented in the EPIC progress notes.

## 2023-06-15 NOTE — PROGRESS NOTES
Polo is here for Day 1, Cycle 5 nivolumab (Opdivo). PIV established; labs drawn as ordered. Labs reviewed. Serum creatinine = 1.56. Dr. Gallagher notified. Orders received to proceed with treatment today and to educate Polo on increasing hydration. Educated Polo regarding importance of hydration; Polo verbalized understanding. Polo to follow up with MD's office regarding BMP recheck in 2 weeks. Opdivo given per MAR; in-line filter in place. PIV flushed with normal saline and removed; gauze/coban applied to site. Next appointment scheduled. Discharged to self care; no apparent distress noted.

## 2023-06-15 NOTE — PROGRESS NOTES
"Pharmacy Chemotherapy Calculation:    Dx: renal cell carcinoma         Protocol: Nivolumab + Ipilimumab followed by Nivolumab     *Dosing Reference*  Nivolumab 3 mg/kg IV over 30 min on Day 1 followed by  Ipilimumab 1 mg/kg IV over 30 min on Day 1   21-day cycle for 4 cycles - finished 5/25/23  ~followed by~  Nivolumab 240 or 480 mg IV over 30 min on Day 1  14- or 28-day cycle until disease progression or unacceptable toxicity  NCCN Guidelines for Kidney Cancer V.3.2023  Issac FRAIRE, et al. J Clin Oncol. 2017;35(34):6241-1924    Allergies:  Patient has no known allergies.     BP (!) 153/82   Pulse 83   Temp 36.7 °C (98 °F) (Temporal)   Resp 18   Ht 1.72 m (5' 7.72\")   Wt 89.1 kg (196 lb 6.9 oz)   SpO2 95%   BMI 30.12 kg/m²  Body surface area is 2.06 meters squared.    Labs 6/15/23:  ANC~ 6240 Plt = 316k   Hgb = 15.7     SCr = 1.56 mg/dL CrCl ~ 56 mL/min   AST/ALT/AP = 17/15/110 TBili = 0.7     TSH = 0.79   Free T4 = 0.93       *Ok to proceed per Dr. Gallagher     Drug Order   (Drug name, dose, route, IV Fluid & volume, frequency, number of doses) Cycle 5  Previous treatment: C4 on 5/25/23     Medication = Nivolumab (Opdivo)  Base Dose = 480 mg   Calc Dose: N/A; fixed dose = 480 mg  Final Dose = 480 mg  Route = IV  Fluid & Volume =  mL  Admin Duration = Over 30 min          Fixed dose, ok to treat with final dose.      By my signature below, I confirm this process was performed independently with the BSA and all final chemotherapy dosing calculations congruent. I have reviewed the above chemotherapy order and that my calculation of the final dose and BSA (when applicable) corroborate those calculations of the  pharmacist. Discrepancies of 10% or greater in the written dose have been addressed and documented within the [facility] Progress notes.    Jazmine Brewer, PharmD,BCOP  "

## 2023-06-15 NOTE — PROGRESS NOTES
"Pharmacy Chemotherapy Verification    Dx: Renal cell cacinoma    Cycle 5  Previous treatment = C4 on 5/25/23    Regimen and Dosing Reference  Nivolumab 3 mg/kg IV over 30 minutes on Day 1 followed by   Ipilimumab 1 mg/kg IV over 30 minutes on Day 1  21 day cycle for 4 cycles followed by    Competed 6/15/23  Nivlumab 480 mg IV over 30 minutes on Day 1   28 day cycle until disease progression or unacceptable toxicity  NCCN Guidelines for Kidney Cancer.V.3.2023  Issac FRAIRE, et al. J Clin Oncol. 2017;35(34):9637-7649  Nette TRAN, et al. Lancet Oncol. 2019;20(10):5679-9804  Atabdi MB, et al. J Clin Oncol. 2021;39(15_suppl);4510    Allergies:Patient has no known allergies.  BP (!) 153/82   Pulse 83   Temp 36.7 °C (98 °F) (Temporal)   Resp 18   Ht 1.72 m (5' 7.72\")   Wt 89.1 kg (196 lb 6.9 oz)   SpO2 95%   BMI 30.12 kg/m²   Body surface area is 2.06 meters squared.    Labs 6/15/23:  ANC~ 6240 Plt = 316k   Hgb = 15.7     SCr = 1.56 mg/dL CrCl ~ 57 mL/min   AST/ALT/ALK  = 17/15/110 TBili = 0.7   TSH 0.79 Free T4 = 0.93    *MD aware of current labs, okay to proceed with treatment today*    Nivolumab 480 mg fixed dose no calculation required    <10% difference, OK to treat with final dose = 480 mg IV    Dixon López, PharmD    "

## 2023-06-28 ENCOUNTER — HOSPITAL ENCOUNTER (OUTPATIENT)
Dept: LAB | Facility: MEDICAL CENTER | Age: 68
End: 2023-06-28
Attending: INTERNAL MEDICINE
Payer: MEDICARE

## 2023-06-28 LAB — CORTIS SERPL-MCNC: 11.9 UG/DL (ref 0–23)

## 2023-06-28 PROCEDURE — 82533 TOTAL CORTISOL: CPT

## 2023-06-28 PROCEDURE — 36415 COLL VENOUS BLD VENIPUNCTURE: CPT

## 2023-07-12 NOTE — PROGRESS NOTES
"Pharmacy Chemotherapy Verification    Dx: Renal cell carcinoma    Cycle 6  Previous treatment = C5 6/15/23    Regimen and Dosing Reference  Nivolumab 3 mg/kg IV over 30 minutes on Day 1 followed by   Ipilimumab 1 mg/kg IV over 30 minutes on Day 1  21 day cycle for 4 cycles followed by   Competed 6/15/23  Nivlumab 480 mg IV over 30 minutes on Day 1   28 day cycle until disease progression or unacceptable toxicity  NCCN Guidelines for Kidney Cancer.V.3.2023  Issac HJ, et al. J Clin Oncol. 2017;35(34):3039-6316  Motzer RJ, et al. Lancet Oncol. 2019;20(10):5894-0024  Atkins MB, et al. J Clin Oncol. 2021;39(15_suppl);4510    Allergies:Patient has no known allergies.  /87   Pulse 77   Temp 36.7 °C (98 °F) (Temporal)   Resp 18   Ht 1.73 m (5' 8.11\") Comment: Shoes off.  Wt 86.8 kg (191 lb 5.8 oz)   SpO2 98%   BMI 29.00 kg/m²   Body surface area is 2.04 meters squared.    Labs 7/13/23  ANC~7850 Hgb 15.1 Plt 355k  SCr 2.23 CrCl 38.4 mL/min   AST/ALT/AP = 20/17/100 Tbili = 0.6  TSH/Free T4 in process    **MD aware of labs, OK to proceed with treatment as outlined below.**    Nivolumab 480 mg fixed dose no calculation required    <10% difference, OK to treat with final dose = 480 mg IV    Kristine Batista, PharmD, BCOP  "

## 2023-07-13 ENCOUNTER — OUTPATIENT INFUSION SERVICES (OUTPATIENT)
Dept: ONCOLOGY | Facility: MEDICAL CENTER | Age: 68
End: 2023-07-13
Attending: INTERNAL MEDICINE
Payer: MEDICARE

## 2023-07-13 VITALS
SYSTOLIC BLOOD PRESSURE: 129 MMHG | OXYGEN SATURATION: 98 % | BODY MASS INDEX: 29 KG/M2 | TEMPERATURE: 98 F | WEIGHT: 191.36 LBS | DIASTOLIC BLOOD PRESSURE: 87 MMHG | HEIGHT: 68 IN | RESPIRATION RATE: 18 BRPM | HEART RATE: 77 BPM

## 2023-07-13 DIAGNOSIS — D69.6 THROMBOCYTOPENIA (HCC): ICD-10-CM

## 2023-07-13 DIAGNOSIS — C64.9 RENAL CELL CARCINOMA, UNSPECIFIED LATERALITY (HCC): ICD-10-CM

## 2023-07-13 LAB
ALBUMIN SERPL BCP-MCNC: 4.4 G/DL (ref 3.2–4.9)
ALBUMIN/GLOB SERPL: 1.3 G/DL
ALP SERPL-CCNC: 100 U/L (ref 30–99)
ALT SERPL-CCNC: 17 U/L (ref 2–50)
ANION GAP SERPL CALC-SCNC: 13 MMOL/L (ref 7–16)
AST SERPL-CCNC: 20 U/L (ref 12–45)
BASOPHILS # BLD AUTO: 0.9 % (ref 0–1.8)
BASOPHILS # BLD: 0.12 K/UL (ref 0–0.12)
BILIRUB SERPL-MCNC: 0.6 MG/DL (ref 0.1–1.5)
BUN SERPL-MCNC: 27 MG/DL (ref 8–22)
CALCIUM ALBUM COR SERPL-MCNC: 9 MG/DL (ref 8.5–10.5)
CALCIUM SERPL-MCNC: 9.3 MG/DL (ref 8.5–10.5)
CHLORIDE SERPL-SCNC: 103 MMOL/L (ref 96–112)
CO2 SERPL-SCNC: 23 MMOL/L (ref 20–33)
CORTIS SERPL-MCNC: 7.8 UG/DL (ref 0–23)
CREAT SERPL-MCNC: 2.23 MG/DL (ref 0.5–1.4)
EOSINOPHIL # BLD AUTO: 0.27 K/UL (ref 0–0.51)
EOSINOPHIL NFR BLD: 2 % (ref 0–6.9)
ERYTHROCYTE [DISTWIDTH] IN BLOOD BY AUTOMATED COUNT: 47.8 FL (ref 35.9–50)
GFR SERPLBLD CREATININE-BSD FMLA CKD-EPI: 31 ML/MIN/1.73 M 2
GLOBULIN SER CALC-MCNC: 3.4 G/DL (ref 1.9–3.5)
GLUCOSE SERPL-MCNC: 105 MG/DL (ref 65–99)
HCT VFR BLD AUTO: 45.7 % (ref 42–52)
HGB BLD-MCNC: 15.1 G/DL (ref 14–18)
IMM GRANULOCYTES # BLD AUTO: 0.05 K/UL (ref 0–0.11)
IMM GRANULOCYTES NFR BLD AUTO: 0.4 % (ref 0–0.9)
LYMPHOCYTES # BLD AUTO: 4.19 K/UL (ref 1–4.8)
LYMPHOCYTES NFR BLD: 30.3 % (ref 22–41)
MCH RBC QN AUTO: 28.9 PG (ref 27–33)
MCHC RBC AUTO-ENTMCNC: 33 G/DL (ref 32.3–36.5)
MCV RBC AUTO: 87.5 FL (ref 81.4–97.8)
MONOCYTES # BLD AUTO: 1.36 K/UL (ref 0–0.85)
MONOCYTES NFR BLD AUTO: 9.8 % (ref 0–13.4)
NEUTROPHILS # BLD AUTO: 7.85 K/UL (ref 1.82–7.42)
NEUTROPHILS NFR BLD: 56.6 % (ref 44–72)
NRBC # BLD AUTO: 0 K/UL
NRBC BLD-RTO: 0 /100 WBC (ref 0–0.2)
OUTPT INFUS CBC COMMENT OICOM: ABNORMAL
PLATELET # BLD AUTO: 355 K/UL (ref 164–446)
PMV BLD AUTO: 10.3 FL (ref 9–12.9)
POTASSIUM SERPL-SCNC: 4.3 MMOL/L (ref 3.6–5.5)
PROT SERPL-MCNC: 7.8 G/DL (ref 6–8.2)
RBC # BLD AUTO: 5.22 M/UL (ref 4.7–6.1)
SODIUM SERPL-SCNC: 139 MMOL/L (ref 135–145)
T4 FREE SERPL-MCNC: 1.09 NG/DL (ref 0.93–1.7)
TESTOST SERPL-MCNC: 464 NG/DL (ref 175–781)
TSH SERPL DL<=0.005 MIU/L-ACNC: 1.02 UIU/ML (ref 0.38–5.33)
WBC # BLD AUTO: 13.8 K/UL (ref 4.8–10.8)

## 2023-07-13 PROCEDURE — 96361 HYDRATE IV INFUSION ADD-ON: CPT

## 2023-07-13 PROCEDURE — 80053 COMPREHEN METABOLIC PANEL: CPT

## 2023-07-13 PROCEDURE — 96413 CHEMO IV INFUSION 1 HR: CPT

## 2023-07-13 PROCEDURE — 84403 ASSAY OF TOTAL TESTOSTERONE: CPT

## 2023-07-13 PROCEDURE — 700105 HCHG RX REV CODE 258: Performed by: INTERNAL MEDICINE

## 2023-07-13 PROCEDURE — 82024 ASSAY OF ACTH: CPT

## 2023-07-13 PROCEDURE — 85025 COMPLETE CBC W/AUTO DIFF WBC: CPT

## 2023-07-13 PROCEDURE — 82533 TOTAL CORTISOL: CPT

## 2023-07-13 PROCEDURE — 700111 HCHG RX REV CODE 636 W/ 250 OVERRIDE (IP): Mod: JZ,JG | Performed by: INTERNAL MEDICINE

## 2023-07-13 PROCEDURE — 84443 ASSAY THYROID STIM HORMONE: CPT

## 2023-07-13 PROCEDURE — 84439 ASSAY OF FREE THYROXINE: CPT

## 2023-07-13 RX ORDER — METHYLPREDNISOLONE SODIUM SUCCINATE 125 MG/2ML
125 INJECTION, POWDER, LYOPHILIZED, FOR SOLUTION INTRAMUSCULAR; INTRAVENOUS PRN
Status: CANCELLED | OUTPATIENT
Start: 2023-07-13

## 2023-07-13 RX ORDER — ONDANSETRON 8 MG/1
8 TABLET, ORALLY DISINTEGRATING ORAL PRN
Status: CANCELLED | OUTPATIENT
Start: 2023-07-13

## 2023-07-13 RX ORDER — SODIUM CHLORIDE 9 MG/ML
INJECTION, SOLUTION INTRAVENOUS CONTINUOUS
Status: CANCELLED | OUTPATIENT
Start: 2023-07-13

## 2023-07-13 RX ORDER — PROCHLORPERAZINE MALEATE 10 MG
10 TABLET ORAL EVERY 6 HOURS PRN
Status: CANCELLED | OUTPATIENT
Start: 2023-07-13

## 2023-07-13 RX ORDER — DIPHENHYDRAMINE HYDROCHLORIDE 50 MG/ML
50 INJECTION INTRAMUSCULAR; INTRAVENOUS PRN
Status: CANCELLED | OUTPATIENT
Start: 2023-07-15

## 2023-07-13 RX ORDER — EPINEPHRINE 1 MG/ML(1)
0.5 AMPUL (ML) INJECTION PRN
Status: CANCELLED | OUTPATIENT
Start: 2023-07-15

## 2023-07-13 RX ORDER — SODIUM CHLORIDE 9 MG/ML
1000 INJECTION, SOLUTION INTRAVENOUS ONCE
Status: CANCELLED
Start: 2023-07-18

## 2023-07-13 RX ORDER — SODIUM CHLORIDE 9 MG/ML
1000 INJECTION, SOLUTION INTRAVENOUS ONCE
Status: COMPLETED | OUTPATIENT
Start: 2023-07-13 | End: 2023-07-13

## 2023-07-13 RX ORDER — ONDANSETRON 2 MG/ML
4 INJECTION INTRAMUSCULAR; INTRAVENOUS PRN
Status: CANCELLED | OUTPATIENT
Start: 2023-07-13

## 2023-07-13 RX ORDER — SODIUM CHLORIDE 9 MG/ML
INJECTION, SOLUTION INTRAVENOUS CONTINUOUS
Status: CANCELLED | OUTPATIENT
Start: 2023-07-15

## 2023-07-13 RX ORDER — 0.9 % SODIUM CHLORIDE 0.9 %
10 VIAL (ML) INJECTION PRN
Status: CANCELLED | OUTPATIENT
Start: 2023-07-13

## 2023-07-13 RX ORDER — 0.9 % SODIUM CHLORIDE 0.9 %
3 VIAL (ML) INJECTION PRN
Status: CANCELLED | OUTPATIENT
Start: 2023-07-13

## 2023-07-13 RX ORDER — 0.9 % SODIUM CHLORIDE 0.9 %
VIAL (ML) INJECTION PRN
Status: CANCELLED | OUTPATIENT
Start: 2023-07-13

## 2023-07-13 RX ORDER — 0.9 % SODIUM CHLORIDE 0.9 %
3 VIAL (ML) INJECTION PRN
Status: CANCELLED | OUTPATIENT
Start: 2023-07-15

## 2023-07-13 RX ORDER — HEPARIN SODIUM (PORCINE) LOCK FLUSH IV SOLN 100 UNIT/ML 100 UNIT/ML
500 SOLUTION INTRAVENOUS PRN
Status: CANCELLED | OUTPATIENT
Start: 2023-07-13

## 2023-07-13 RX ORDER — 0.9 % SODIUM CHLORIDE 0.9 %
10 VIAL (ML) INJECTION PRN
Status: CANCELLED | OUTPATIENT
Start: 2023-07-15

## 2023-07-13 RX ORDER — METHYLPREDNISOLONE SODIUM SUCCINATE 125 MG/2ML
125 INJECTION, POWDER, LYOPHILIZED, FOR SOLUTION INTRAMUSCULAR; INTRAVENOUS PRN
Status: CANCELLED | OUTPATIENT
Start: 2023-07-15

## 2023-07-13 RX ORDER — DIPHENHYDRAMINE HYDROCHLORIDE 50 MG/ML
50 INJECTION INTRAMUSCULAR; INTRAVENOUS PRN
Status: CANCELLED | OUTPATIENT
Start: 2023-07-13

## 2023-07-13 RX ORDER — EPINEPHRINE 1 MG/ML(1)
0.5 AMPUL (ML) INJECTION PRN
Status: CANCELLED | OUTPATIENT
Start: 2023-07-13

## 2023-07-13 RX ORDER — 0.9 % SODIUM CHLORIDE 0.9 %
VIAL (ML) INJECTION PRN
Status: CANCELLED | OUTPATIENT
Start: 2023-07-15

## 2023-07-13 RX ADMIN — SODIUM CHLORIDE 480 MG: 9 INJECTION, SOLUTION INTRAVENOUS at 16:25

## 2023-07-13 RX ADMIN — SODIUM CHLORIDE 1000 ML: 9 INJECTION, SOLUTION INTRAVENOUS at 15:49

## 2023-07-13 ASSESSMENT — FIBROSIS 4 INDEX: FIB4 SCORE: 0.94

## 2023-07-13 NOTE — PROGRESS NOTES
"Pharmacy Chemotherapy Calculation:    Dx: renal cell carcinoma         Protocol: Nivolumab + Ipilimumab followed by Nivolumab     *Dosing Reference*  Nivolumab 3 mg/kg IV over 30 min on Day 1 followed by  Ipilimumab 1 mg/kg IV over 30 min on Day 1   21-day cycle for 4 cycles - finished 5/25/23  ~followed by~  Nivolumab 240 or 480 mg IV over 30 min on Day 1  14- or 28-day cycle until disease progression or unacceptable toxicity  NCCN Guidelines for Kidney Cancer V.3.2023  Issac FRAIRE, et al. J Clin Oncol. 2017;35(34):4955-7746    Allergies:  Patient has no known allergies.     /87   Pulse 77   Temp 36.7 °C (98 °F) (Temporal)   Resp 18   Ht 1.73 m (5' 8.11\") Comment: Shoes off.  Wt 86.8 kg (191 lb 5.8 oz)   SpO2 98%   BMI 29.00 kg/m²  Body surface area is 2.04 meters squared.    Labs 7/13/23:  ANC~ 7850 Plt = 355k   Hgb = 15.1     SCr = 2.23 mg/dL CrCl ~ 38.4mL/min   AST/ALT/AP = 20/17/100 TBili = 0.6     TSH = 1.02   Free T4 = 1.09       *Ok to proceed after 1L NS per Dr. Gallagher 7/13/23    Drug Order   (Drug name, dose, route, IV Fluid & volume, frequency, number of doses) Cycle 6  Previous treatment: C5 on 5/25/23     Medication = Nivolumab (Opdivo)  Base Dose = 480 mg   Calc Dose: N/A; fixed dose = 480 mg  Final Dose = 480 mg  Route = IV  Fluid & Volume =  mL  Admin Duration = Over 30 min          Fixed dose, ok to treat with final dose.      By my signature below, I confirm this process was performed independently with the BSA and all final chemotherapy dosing calculations congruent. I have reviewed the above chemotherapy order and that my calculation of the final dose and BSA (when applicable) corroborate those calculations of the  pharmacist. Discrepancies of 10% or greater in the written dose have been addressed and documented within the [facility] Progress notes.    Elena Nazario, PharmD,  "

## 2023-07-13 NOTE — PROGRESS NOTES
Pt arrives to [facility] for cycle 6 of Opdivo.  Discussed plan of care with pt.  Pt denies s/sx of infection, nausea or pain.  PIV established to L-AC and labs drawn.  Labs reviewed and results do not meet parameters for treatment.   Creatinine is 2.23 today.  Informed Dr. Gallagher of labs.  Received telephone order from Dr. Gallagher  to give 1L NS hydration with treatment and ok to proceed today.  Informed pt of plan of care and educated pt on the importance of daily hydration intake.  1L of NS given.  Opdivo infused without adverse reaction.  PIV flushed with NS and site was removed intact.  Site covered with gauze/coban.  Received telephone order from Dr. Gallagher to schedule hydration/CMP lab draw on 7/18/23.  Informed pt of appt time on 7/18/23 at 0900.  Email sent to scheduling dept to adjust next Opdivo appt to 8/17/23.  Pt will have scans 8/09/23 and MD appt is on 8/15/23.  Per Dr. Gallagher, ok to adjust appt by one week so he can review scans with pt.  Pt dc home to self care.

## 2023-07-13 NOTE — PROGRESS NOTES
Chemotherapy Verification - SECONDARY RN       Height = 1.73m  Weight = 86.8kg  BSA = 2.04m2       Medication: nivolumab  Dose: flat dose  Calculated Dose: 480mg                             (In mg/m2, AUC, mg/kg)     I confirm that this process was performed independently.

## 2023-07-13 NOTE — PROGRESS NOTES
Chemotherapy Verification - PRIMARY RN      Height = 173 cm  Weight = 86.8 kg  BSA = 2.04 m^2       Medication: Nivolumab (Opdivo)  Dose: set dose 480 mg  Calculated Dose: 480 mg                             (In mg/m2, AUC, mg/kg)    I confirm this process was performed independently with the BSA and all final chemotherapy dosing calculations congruent.  Any discrepancies of 10% or greater have been addressed with the chemotherapy pharmacist. The resolution of the discrepancy has been documented in the EPIC progress notes.

## 2023-07-15 LAB — ACTH PLAS-MCNC: 36.1 PG/ML (ref 7.2–63.3)

## 2023-07-18 ENCOUNTER — OUTPATIENT INFUSION SERVICES (OUTPATIENT)
Dept: ONCOLOGY | Facility: MEDICAL CENTER | Age: 68
End: 2023-07-18
Attending: INTERNAL MEDICINE
Payer: MEDICARE

## 2023-07-18 VITALS
BODY MASS INDEX: 28.83 KG/M2 | RESPIRATION RATE: 18 BRPM | TEMPERATURE: 97 F | WEIGHT: 190.26 LBS | HEART RATE: 96 BPM | SYSTOLIC BLOOD PRESSURE: 138 MMHG | HEIGHT: 68 IN | OXYGEN SATURATION: 94 % | DIASTOLIC BLOOD PRESSURE: 83 MMHG

## 2023-07-18 DIAGNOSIS — C64.9 RENAL CELL CARCINOMA, UNSPECIFIED LATERALITY (HCC): ICD-10-CM

## 2023-07-18 LAB
ALBUMIN SERPL BCP-MCNC: 4.5 G/DL (ref 3.2–4.9)
ALBUMIN/GLOB SERPL: 1.2 G/DL
ALP SERPL-CCNC: 100 U/L (ref 30–99)
ALT SERPL-CCNC: 13 U/L (ref 2–50)
ANION GAP SERPL CALC-SCNC: 12 MMOL/L (ref 7–16)
AST SERPL-CCNC: 16 U/L (ref 12–45)
BILIRUB SERPL-MCNC: 0.7 MG/DL (ref 0.1–1.5)
BUN SERPL-MCNC: 27 MG/DL (ref 8–22)
CALCIUM ALBUM COR SERPL-MCNC: 9.4 MG/DL (ref 8.5–10.5)
CALCIUM SERPL-MCNC: 9.8 MG/DL (ref 8.5–10.5)
CHLORIDE SERPL-SCNC: 98 MMOL/L (ref 96–112)
CO2 SERPL-SCNC: 25 MMOL/L (ref 20–33)
CREAT SERPL-MCNC: 2.15 MG/DL (ref 0.5–1.4)
GFR SERPLBLD CREATININE-BSD FMLA CKD-EPI: 33 ML/MIN/1.73 M 2
GLOBULIN SER CALC-MCNC: 3.8 G/DL (ref 1.9–3.5)
GLUCOSE SERPL-MCNC: 108 MG/DL (ref 65–99)
POTASSIUM SERPL-SCNC: 4.2 MMOL/L (ref 3.6–5.5)
PROT SERPL-MCNC: 8.3 G/DL (ref 6–8.2)
SODIUM SERPL-SCNC: 135 MMOL/L (ref 135–145)

## 2023-07-18 PROCEDURE — 80053 COMPREHEN METABOLIC PANEL: CPT

## 2023-07-18 PROCEDURE — 96360 HYDRATION IV INFUSION INIT: CPT

## 2023-07-18 PROCEDURE — 700105 HCHG RX REV CODE 258: Performed by: INTERNAL MEDICINE

## 2023-07-18 RX ORDER — ONDANSETRON 8 MG/1
TABLET, ORALLY DISINTEGRATING ORAL
COMMUNITY
Start: 2023-06-27

## 2023-07-18 RX ORDER — SODIUM CHLORIDE 9 MG/ML
1000 INJECTION, SOLUTION INTRAVENOUS ONCE
Status: COMPLETED | OUTPATIENT
Start: 2023-07-18 | End: 2023-07-18

## 2023-07-18 RX ADMIN — SODIUM CHLORIDE 1000 ML: 9 INJECTION, SOLUTION INTRAVENOUS at 09:15

## 2023-07-18 ASSESSMENT — FIBROSIS 4 INDEX: FIB4 SCORE: 0.93

## 2023-07-18 NOTE — PROGRESS NOTES
Polo to infusion for labs and hydration. Reports feeling well today, has been drinking plenty of fluids and denies n/v/d. PIV started with positive blood and labs drawn. 1L NS infused over 1 hour, patient tolerated well with no adverse effects. PIV flushed post infusion with positive blood, d/orsa maria with tip intact. CMP still pending, RN to call patient with results. Patient left in stable condition. CMP reviewed by RN and called to Hyacinth GRANADOS at [facility], no further orders at this time. Patient called and updated with lab results.

## 2023-07-21 ENCOUNTER — DOCUMENTATION (OUTPATIENT)
Dept: HEALTH INFORMATION MANAGEMENT | Facility: OTHER | Age: 68
End: 2023-07-21
Payer: MEDICARE

## 2023-07-27 ENCOUNTER — HOSPITAL ENCOUNTER (OUTPATIENT)
Dept: RADIOLOGY | Facility: MEDICAL CENTER | Age: 68
End: 2023-07-27
Attending: INTERNAL MEDICINE
Payer: MEDICARE

## 2023-08-08 ENCOUNTER — TELEPHONE (OUTPATIENT)
Dept: URGENT CARE | Facility: CLINIC | Age: 68
End: 2023-08-08

## 2023-08-08 ENCOUNTER — OFFICE VISIT (OUTPATIENT)
Dept: URGENT CARE | Facility: CLINIC | Age: 68
End: 2023-08-08
Payer: MEDICARE

## 2023-08-08 VITALS
TEMPERATURE: 98.6 F | HEART RATE: 95 BPM | DIASTOLIC BLOOD PRESSURE: 72 MMHG | OXYGEN SATURATION: 93 % | RESPIRATION RATE: 20 BRPM | HEIGHT: 69 IN | SYSTOLIC BLOOD PRESSURE: 150 MMHG | WEIGHT: 190 LBS | BODY MASS INDEX: 28.14 KG/M2

## 2023-08-08 DIAGNOSIS — R05.1 ACUTE COUGH: ICD-10-CM

## 2023-08-08 DIAGNOSIS — J02.9 SORE THROAT: ICD-10-CM

## 2023-08-08 DIAGNOSIS — U07.1 COVID-19: ICD-10-CM

## 2023-08-08 LAB
FLUAV RNA SPEC QL NAA+PROBE: NEGATIVE
FLUBV RNA SPEC QL NAA+PROBE: NEGATIVE
INT CON NEG: NEGATIVE
INT CON POS: POSITIVE
RSV RNA SPEC QL NAA+PROBE: NEGATIVE
S PYO AG THROAT QL: NEGATIVE
SARS-COV-2 RNA RESP QL NAA+PROBE: POSITIVE

## 2023-08-08 PROCEDURE — 99215 OFFICE O/P EST HI 40 MIN: CPT | Performed by: PHYSICIAN ASSISTANT

## 2023-08-08 PROCEDURE — 3078F DIAST BP <80 MM HG: CPT | Performed by: PHYSICIAN ASSISTANT

## 2023-08-08 PROCEDURE — 87880 STREP A ASSAY W/OPTIC: CPT | Performed by: PHYSICIAN ASSISTANT

## 2023-08-08 PROCEDURE — 3077F SYST BP >= 140 MM HG: CPT | Performed by: PHYSICIAN ASSISTANT

## 2023-08-08 PROCEDURE — 0241U POCT CEPHEID COV-2, FLU A/B, RSV - PCR: CPT | Performed by: PHYSICIAN ASSISTANT

## 2023-08-08 RX ORDER — DEXTROMETHORPHAN HYDROBROMIDE AND PROMETHAZINE HYDROCHLORIDE 15; 6.25 MG/5ML; MG/5ML
5 SYRUP ORAL EVERY 4 HOURS PRN
Qty: 120 ML | Refills: 0 | Status: SHIPPED | OUTPATIENT
Start: 2023-08-08

## 2023-08-08 ASSESSMENT — FIBROSIS 4 INDEX: FIB4 SCORE: 0.85

## 2023-08-08 ASSESSMENT — ENCOUNTER SYMPTOMS
TROUBLE SWALLOWING: 0
SWOLLEN GLANDS: 0
COUGH: 1

## 2023-08-08 NOTE — PROGRESS NOTES
Subjective:   Polo Johnson is a 67 y.o. male who presents for Pharyngitis (Pt has cancer with a large tumor on the left side of the face. )        Hx of metastatic renal cell carcinoma. Currently undergoing chemotherapy.    Pharyngitis   This is a new problem. Episode onset: 3 days. The problem has been gradually worsening. The pain is severe. Associated symptoms include coughing. Pertinent negatives include no congestion, ear pain, swollen glands or trouble swallowing. Associated symptoms comments: Fatigue, sweats. He has had no exposure to strep or mono. He has tried nothing for the symptoms. The treatment provided no relief.     Review of Systems   HENT:  Negative for congestion, ear pain and trouble swallowing.    Respiratory:  Positive for cough.        PMH:  has a past medical history of Arthritis, Bronchitis (2020), Cancer ([facility]) (1990), Dental disorder, Heart burn, Indigestion, Pneumonia (2020), Psychiatric problem, Renal carcinoma, right ([facility]) (1998), Seasonal allergies, and Sinus infection.  MEDS:   Current Outpatient Medications:     promethazine-dextromethorphan (PROMETHAZINE-DM) 6.25-15 MG/5ML syrup, Take 5 mL by mouth every four hours as needed for Cough., Disp: 120 mL, Rfl: 0    Nirmatrelvir&Ritonavir 150/100 10 x 150 MG & 10 x 100MG Tablet Therapy Pack, Take 150 mg nirmatrelvir (one 150 mg tablet) with 100 mg ritonavir (one 100 mg tablet) by mouth, with both tablets taken together twice daily for 5 days., Disp: 20 Each, Rfl: 0    ondansetron (ZOFRAN ODT) 8 MG TABLET DISPERSIBLE, DISSOLVE 1 TABLET IN MOUTH EVERY 12 HOURS AS NEEDED FOR NAUSEA, Disp: , Rfl:     Cetirizine HCl (ZYRTEC PO), Take  by mouth., Disp: , Rfl:     triamcinolone acetonide (KENALOG) 0.1 % Cream, Apply 1 Application topically 1 time a day as needed., Disp: , Rfl:     HYDROcodone-acetaminophen (NORCO) 5-325 MG Tab per tablet, Take 1 Tablet by mouth 3 times a day as needed. for pain, Disp: , Rfl:     diphenhydrAMINE  "(BENADRYL) 25 MG Tab, Take 25 mg by mouth at bedtime as needed for Sleep., Disp: , Rfl:     acetaminophen (TYLENOL) 500 MG Tab, Take 500-1,000 mg by mouth 2 times a day as needed. Indications: Pain, Disp: , Rfl:     busPIRone (BUSPAR) 7.5 MG tablet, Take 15 mg by mouth 2 times a day., Disp: , Rfl:     omeprazole (PRILOSEC) 20 MG delayed-release capsule, Take 20 mg by mouth every day., Disp: , Rfl:     Oxymetazoline HCl (AFRIN NASAL SPRAY NA), Administer 1 Spray into affected nostril(S) 1 time a day as needed., Disp: , Rfl:     Levocetirizine Dihydrochloride (XYZAL ALLERGY 24HR PO), Take 1 Tablet by mouth at bedtime as needed. (Patient not taking: Reported on 6/15/2023), Disp: , Rfl:   ALLERGIES: No Known Allergies  SURGHX:   Past Surgical History:   Procedure Laterality Date    NEPHRECTOMY RADICAL Right      SOCHX:  reports that he has never smoked. He has never used smokeless tobacco. He reports that he does not drink alcohol and does not use drugs.  FH: Family history was reviewed, no pertinent findings to report   Objective:   BP (!) 150/72 (BP Location: Left arm, Patient Position: Sitting, BP Cuff Size: Adult)   Pulse 95   Temp 37 °C (98.6 °F) (Temporal)   Resp 20   Ht 1.753 m (5' 9\")   Wt 86.2 kg (190 lb)   SpO2 93%   BMI 28.06 kg/m²   Physical Exam  Vitals reviewed.   Constitutional:       General: He is not in acute distress.     Appearance: Normal appearance. He is well-developed. He is not toxic-appearing.   HENT:      Head: Normocephalic.      Comments: Mass of left cheek     Right Ear: External ear normal.      Left Ear: External ear normal.      Nose: Rhinorrhea present. Rhinorrhea is clear.      Mouth/Throat:      Lips: Pink.      Mouth: Mucous membranes are moist.      Tongue: No lesions.      Palate: No mass.      Pharynx: Oropharynx is clear. Uvula midline. Posterior oropharyngeal erythema present. No oropharyngeal exudate or uvula swelling.   Cardiovascular:      Rate and Rhythm: Normal rate " and regular rhythm.   Pulmonary:      Effort: Pulmonary effort is normal. No respiratory distress.      Breath sounds: Normal breath sounds. No stridor. No decreased breath sounds, wheezing, rhonchi or rales.   Skin:     General: Skin is dry.   Neurological:      Comments: Alert and oriented.    Psychiatric:         Speech: Speech normal.         Behavior: Behavior normal.           Results for orders placed or performed in visit on 08/08/23   POCT CoV-2, Flu A/B, RSV by PCR   Result Value Ref Range    SARS-CoV-2 by PCR Positive (A) Negative, Invalid    Influenza virus A RNA Negative Negative, Invalid    Influenza virus B, PCR Negative Negative, Invalid    RSV, PCR Negative Negative, Invalid   POCT Rapid Strep A   Result Value Ref Range    Rapid Strep Screen negative     Internal Control Positive Positive     Internal Control Negative Negative        Assessment/Plan:   1. COVID-19  - Nirmatrelvir&Ritonavir 150/100 10 x 150 MG & 10 x 100MG Tablet Therapy Pack; Take 150 mg nirmatrelvir (one 150 mg tablet) with 100 mg ritonavir (one 100 mg tablet) by mouth, with both tablets taken together twice daily for 5 days.  Dispense: 20 Each; Refill: 0    2. Sore throat  - POCT CoV-2, Flu A/B, RSV by PCR  - POCT Rapid Strep A    3. Acute cough  - promethazine-dextromethorphan (PROMETHAZINE-DM) 6.25-15 MG/5ML syrup; Take 5 mL by mouth every four hours as needed for Cough.  Dispense: 120 mL; Refill: 0  - POCT CoV-2, Flu A/B, RSV by PCR    Positive for Covid 19. Pt has good understanding of etiology and disease course. Quarantine IAW CDC guidelines- guidelines reviewed.    Pt qualifies for treatment with Paxlovid. Paxlovid risks, benefits, side effects reviewed. Medications reviewed for potential interactions. Will hold Buspar, norco, and tylenol while taking. Pt spoke with oncology who confirmed no interaction with chemotherapeutic agents and recommend tx with Paxlovid. eGFR on  7/8/23 was 33- medication renal dose  adjusted.    Patient may also continue symptomatic care with 12-hour Mucinex and antitussives as needed.  Recommend reevaluation with any new or worsening symptoms.  If patient develops severe symptoms such as shortness of breath, chest pain, difficulty breathing, pain with breathing or other severe and concerning symptoms-to ED for reevaluation.      My total time spent caring for the patient on the day of the encounter was 56 minutes.   This does not include time spent on separately billable procedures/tests.

## 2023-08-08 NOTE — TELEPHONE ENCOUNTER
Hello , patient would like you to send plaxovid to [facility] on [address]. [facility] [address] does not carry it.   thank you.

## 2023-08-14 ENCOUNTER — OFFICE VISIT (OUTPATIENT)
Dept: URGENT CARE | Facility: CLINIC | Age: 68
End: 2023-08-14
Payer: MEDICARE

## 2023-08-14 VITALS
BODY MASS INDEX: 27.61 KG/M2 | WEIGHT: 186.4 LBS | TEMPERATURE: 99.3 F | SYSTOLIC BLOOD PRESSURE: 140 MMHG | DIASTOLIC BLOOD PRESSURE: 60 MMHG | HEIGHT: 69 IN | OXYGEN SATURATION: 95 % | RESPIRATION RATE: 18 BRPM | HEART RATE: 107 BPM

## 2023-08-14 DIAGNOSIS — U07.1 COVID: ICD-10-CM

## 2023-08-14 DIAGNOSIS — Z85.528 HISTORY OF RENAL CELL CARCINOMA: ICD-10-CM

## 2023-08-14 LAB
FLUAV RNA SPEC QL NAA+PROBE: NEGATIVE
FLUBV RNA SPEC QL NAA+PROBE: NEGATIVE
RSV RNA SPEC QL NAA+PROBE: NEGATIVE
SARS-COV-2 RNA RESP QL NAA+PROBE: POSITIVE

## 2023-08-14 PROCEDURE — 3077F SYST BP >= 140 MM HG: CPT | Performed by: FAMILY MEDICINE

## 2023-08-14 PROCEDURE — 3078F DIAST BP <80 MM HG: CPT | Performed by: FAMILY MEDICINE

## 2023-08-14 PROCEDURE — 99213 OFFICE O/P EST LOW 20 MIN: CPT | Performed by: FAMILY MEDICINE

## 2023-08-14 PROCEDURE — 0241U POCT CEPHEID COV-2, FLU A/B, RSV - PCR: CPT | Performed by: FAMILY MEDICINE

## 2023-08-14 ASSESSMENT — ENCOUNTER SYMPTOMS
SHORTNESS OF BREATH: 0
COUGH: 1
VOMITING: 0
SORE THROAT: 0
DIZZINESS: 0
CHILLS: 0
NAUSEA: 0
FEVER: 0
MYALGIAS: 0

## 2023-08-14 ASSESSMENT — FIBROSIS 4 INDEX: FIB4 SCORE: 0.85

## 2023-08-14 NOTE — PROGRESS NOTES
Subjective:   Polo Johnson is a 67 y.o. male who presents for Coronavirus Screening (Needs to be tested for covid again )        Cough  Chronicity: Reports recent COVID with positive SARS-CoV-2 PCR testing on 8/8/2023 and reports completion of antiviral course. The current episode started in the past 7 days. The problem has been gradually improving. Cough characteristics: Reports cough has decreased. Pertinent negatives include no chills, fever, myalgias, rash, sore throat or shortness of breath. Associated symptoms comments: Denies limiting symptoms at this time, requests SARS-CoV-2 PCR testing for pending oncology infusions    Reports negative home COVID-19 antigen testing 3 days prior. Treatments tried: Paxlovid antiviral. The treatment provided moderate relief. Renal cell carcinoma with recent metastases     PMH:  has a past medical history of Arthritis, Bronchitis (2020), Cancer (HCC) (1990), Dental disorder, Heart burn, Indigestion, Pneumonia (2020), Psychiatric problem, Renal carcinoma, right (HCC) (1998), Seasonal allergies, and Sinus infection.  MEDS:   Current Outpatient Medications:     promethazine-dextromethorphan (PROMETHAZINE-DM) 6.25-15 MG/5ML syrup, Take 5 mL by mouth every four hours as needed for Cough., Disp: 120 mL, Rfl: 0    Nirmatrelvir&Ritonavir 150/100 10 x 150 MG & 10 x 100MG Tablet Therapy Pack, Take 150 mg nirmatrelvir (one 150 mg tablet) with 100 mg ritonavir (one 100 mg tablet) by mouth, with both tablets taken together twice daily for 5 days., Disp: 20 Each, Rfl: 0    ondansetron (ZOFRAN ODT) 8 MG TABLET DISPERSIBLE, DISSOLVE 1 TABLET IN MOUTH EVERY 12 HOURS AS NEEDED FOR NAUSEA, Disp: , Rfl:     Cetirizine HCl (ZYRTEC PO), Take  by mouth., Disp: , Rfl:     triamcinolone acetonide (KENALOG) 0.1 % Cream, Apply 1 Application topically 1 time a day as needed., Disp: , Rfl:     HYDROcodone-acetaminophen (NORCO) 5-325 MG Tab per tablet, Take 1 Tablet by mouth 3 times a day as  "needed. for pain, Disp: , Rfl:     diphenhydrAMINE (BENADRYL) 25 MG Tab, Take 25 mg by mouth at bedtime as needed for Sleep., Disp: , Rfl:     acetaminophen (TYLENOL) 500 MG Tab, Take 500-1,000 mg by mouth 2 times a day as needed. Indications: Pain, Disp: , Rfl:     busPIRone (BUSPAR) 7.5 MG tablet, Take 15 mg by mouth 2 times a day., Disp: , Rfl:     Levocetirizine Dihydrochloride (XYZAL ALLERGY 24HR PO), Take 1 Tablet by mouth at bedtime as needed. (Patient not taking: Reported on 6/15/2023), Disp: , Rfl:     omeprazole (PRILOSEC) 20 MG delayed-release capsule, Take 20 mg by mouth every day., Disp: , Rfl:     Oxymetazoline HCl (AFRIN NASAL SPRAY NA), Administer 1 Spray into affected nostril(S) 1 time a day as needed., Disp: , Rfl:   ALLERGIES: No Known Allergies  SURGHX:   Past Surgical History:   Procedure Laterality Date    NEPHRECTOMY RADICAL Right      SOCHX:  reports that he has never smoked. He has never used smokeless tobacco. He reports that he does not drink alcohol and does not use drugs.  FH: History reviewed. No pertinent family history.  Review of Systems   Constitutional:  Negative for chills and fever.   HENT:  Negative for sore throat.    Respiratory:  Positive for cough. Negative for shortness of breath.    Gastrointestinal:  Negative for nausea and vomiting.   Musculoskeletal:  Negative for myalgias.   Skin:  Negative for rash.   Neurological:  Negative for dizziness.        Objective:   BP (!) 140/60   Pulse (!) 107   Temp 37.4 °C (99.3 °F) (Temporal)   Resp 18   Ht 1.753 m (5' 9\")   Wt 84.6 kg (186 lb 6.4 oz)   SpO2 95%   BMI 27.53 kg/m²   Physical Exam  Vitals and nursing note reviewed.   Constitutional:       General: He is not in acute distress.     Appearance: He is well-developed.   HENT:      Head: Normocephalic and atraumatic.        Right Ear: External ear normal.      Left Ear: External ear normal.      Nose: Nose normal.      Mouth/Throat:      Mouth: Mucous membranes are " moist.   Eyes:      Conjunctiva/sclera: Conjunctivae normal.   Cardiovascular:      Rate and Rhythm: Normal rate.   Pulmonary:      Effort: Pulmonary effort is normal. No respiratory distress.      Breath sounds: Normal breath sounds.   Abdominal:      General: There is no distension.   Musculoskeletal:         General: Normal range of motion.   Skin:     General: Skin is warm and dry.   Neurological:      General: No focal deficit present.      Mental Status: He is alert and oriented to person, place, and time. Mental status is at baseline.      Gait: Gait (gait at baseline) normal.   Psychiatric:         Judgment: Judgment normal.           Assessment/Plan:   1. COVID  - POCT CoV-2, Flu A/B, RSV by PCR    2. History of renal cell carcinoma        Medical Decision Making/Course:  In the course of preparing for this visit with review of the pertinent past medical history, recent and past clinic visits, current medications, and performing chart, immunization, medical history and medication reconciliation, and in the further course of obtaining the current history pertinent to the clinic visit today, performing an exam and evaluation, ordering and independently evaluating labs, tests including Influenza A, Influenza B, RSV, and SARS CoV-2 by PCR testing which was recommended by oncologist for upcoming infusions for renal cell carcinoma parotid mass   , and/or procedures, prescribing any recommended new medications as noted above, providing any pertinent counseling and education and recommending further coordination of care including recommendations to follow-up with oncology and/or primary care provider as scheduled pending SARS-CoV-2 PCR testing, at least  16 minutes of total time were spent during this encounter.      Discussed close monitoring, return precautions, and supportive measures of maintaining adequate fluid hydration and caloric intake, relative rest and symptom management as needed for pain and/or  fever.    Differential diagnosis, natural history, supportive care, and indications for immediate follow-up discussed.     Advised the patient to follow-up with the primary care physician for recheck, reevaluation, and consideration of further management.    Please note that this dictation was created using voice recognition software. I have worked with consultants from the vendor as well as technical experts from [facility] at the [facility] to optimize the interface. I have made every reasonable attempt to correct obvious errors, but I expect that there are errors of grammar and possibly content that I did not discover before finalizing the note.

## 2023-08-15 ENCOUNTER — TELEPHONE (OUTPATIENT)
Dept: ONCOLOGY | Facility: MEDICAL CENTER | Age: 68
End: 2023-08-15
Payer: MEDICARE

## 2023-08-15 NOTE — TELEPHONE ENCOUNTER
Patient called and stated that he has COVID. He will not be able to be treated on 8-17-23. Dr. Gallagher notified.

## 2023-08-17 ENCOUNTER — APPOINTMENT (OUTPATIENT)
Dept: ONCOLOGY | Facility: MEDICAL CENTER | Age: 68
End: 2023-08-17
Attending: INTERNAL MEDICINE
Payer: MEDICARE

## 2023-08-22 ENCOUNTER — OFFICE VISIT (OUTPATIENT)
Dept: URGENT CARE | Facility: CLINIC | Age: 68
End: 2023-08-22
Payer: MEDICARE

## 2023-08-22 VITALS
HEIGHT: 69 IN | HEART RATE: 80 BPM | SYSTOLIC BLOOD PRESSURE: 126 MMHG | OXYGEN SATURATION: 100 % | WEIGHT: 189.4 LBS | DIASTOLIC BLOOD PRESSURE: 70 MMHG | RESPIRATION RATE: 20 BRPM | TEMPERATURE: 98 F | BODY MASS INDEX: 28.05 KG/M2

## 2023-08-22 DIAGNOSIS — Z11.52 ENCOUNTER FOR SCREENING FOR COVID-19: ICD-10-CM

## 2023-08-22 LAB
FLUAV RNA SPEC QL NAA+PROBE: NEGATIVE
FLUBV RNA SPEC QL NAA+PROBE: NEGATIVE
RSV RNA SPEC QL NAA+PROBE: NEGATIVE
SARS-COV-2 RNA RESP QL NAA+PROBE: NEGATIVE

## 2023-08-22 PROCEDURE — 3074F SYST BP LT 130 MM HG: CPT | Performed by: PHYSICIAN ASSISTANT

## 2023-08-22 PROCEDURE — 3078F DIAST BP <80 MM HG: CPT | Performed by: PHYSICIAN ASSISTANT

## 2023-08-22 PROCEDURE — 99212 OFFICE O/P EST SF 10 MIN: CPT | Performed by: PHYSICIAN ASSISTANT

## 2023-08-22 PROCEDURE — 0241U POCT CEPHEID COV-2, FLU A/B, RSV - PCR: CPT | Performed by: PHYSICIAN ASSISTANT

## 2023-08-22 ASSESSMENT — FIBROSIS 4 INDEX: FIB4 SCORE: 0.86

## 2023-08-22 NOTE — PROGRESS NOTES
"Subjective:   Polo Johnson is a 68 y.o. male who presents for Follow-Up (Patient needs COVID test/to continue with cancer treatment )      HPI  The patient presents to the Urgent Care for repeat COVID test.  History of cancer and currently being treated with infusions.  Symptom onset 8/5 and he has tested twice with PCR COVID test since then.  He was treated with renally dosed Paxlovid.  He has been feeling a lot better and only has a mild intermittent cough.  No other COVID-19 symptoms other than feeling tired.  He states his doctor told him he needs a negative COVID test in order to continue cancer treatment.   Denies any fever, chills, chest pain, SOB, vomiting, diarrhea.          Past Medical History:   Diagnosis Date    Arthritis     shoulder    Bronchitis 2020    Cancer ([facility]) 1990    Dental disorder     full dentures    Heart burn     Indigestion     Pneumonia 2020    Psychiatric problem     anxiety    Renal carcinoma, right ([facility]) 1998    Seasonal allergies     Sinus infection      No Known Allergies     Objective:     /70 (BP Location: Left arm, Patient Position: Sitting)   Pulse 80   Temp 36.7 °C (98 °F) (Temporal)   Resp 20   Ht 1.753 m (5' 9\")   Wt 85.9 kg (189 lb 6.4 oz)   SpO2 100%   BMI 27.97 kg/m²     Physical Exam  Vitals reviewed.   Constitutional:       General: He is not in acute distress.     Appearance: Normal appearance. He is not ill-appearing or toxic-appearing.   HENT:      Mouth/Throat:      Mouth: Mucous membranes are moist.      Pharynx: Oropharynx is clear.   Eyes:      Conjunctiva/sclera: Conjunctivae normal.   Cardiovascular:      Rate and Rhythm: Normal rate and regular rhythm.   Pulmonary:      Effort: Pulmonary effort is normal.      Breath sounds: Normal breath sounds.   Musculoskeletal:      Cervical back: Neck supple.   Skin:     General: Skin is warm and dry.   Neurological:      General: No focal deficit present.      Mental Status: He is alert and oriented " to person, place, and time.   Psychiatric:         Mood and Affect: Mood normal.         Behavior: Behavior normal.         Diagnosis and associated orders:     1. Encounter for screening for COVID-19  - POCT CoV-2, Flu A/B, RSV by PCR       Comments/MDM:     This is a 68-year-old male with a history of cancer who presents to the urgent care for repeat COVID-19 testing.  He states his oncologist is requiring a negative PCR COVID test in order to continue cancer treatment plan.  Discussed with patient since his symptoms are resolving and no fever in the last 24 hours, I do not believe the patient to be contagious, however patient states his doctor requires a negative COVID test in order to continue treatment.  He states he will not accept a clinic note to allow continue treatment and only accepts a negative COVID test.  I will call patient with COVID test.  In the meantime, increase fluid intake, over-the-counter cough medicine for cough.  He is overall well-appearing in no acute distress.  Normal pulmonary and cardiovascular examination.  Normal vital signs.       I personally reviewed prior external notes and test results pertinent to today's visit. Pathogenesis of diagnosis discussed including typical length and natural progression. Supportive care, natural history, differential diagnoses, and indications for immediate follow-up discussed. Patient expresses understanding and agrees to plan. Patient denies any other questions or concerns.     Follow-up with the primary care physician for recheck, reevaluation, and consideration of further management.    Please note that this dictation was created using voice recognition software. I have made a reasonable attempt to correct obvious errors, but I expect that there are errors of grammar and possibly content that I did not discover before finalizing the note.    This note was electronically signed by Alejandro Borges PA-C

## 2023-09-14 ENCOUNTER — OUTPATIENT INFUSION SERVICES (OUTPATIENT)
Dept: ONCOLOGY | Facility: MEDICAL CENTER | Age: 68
End: 2023-09-14
Attending: INTERNAL MEDICINE
Payer: MEDICARE

## 2023-09-20 RX ORDER — 0.9 % SODIUM CHLORIDE 0.9 %
10 VIAL (ML) INJECTION PRN
Status: CANCELLED | OUTPATIENT
Start: 2023-09-21

## 2023-09-20 RX ORDER — ONDANSETRON 8 MG/1
8 TABLET, ORALLY DISINTEGRATING ORAL PRN
Status: CANCELLED | OUTPATIENT
Start: 2023-09-21

## 2023-09-20 RX ORDER — DIPHENHYDRAMINE HYDROCHLORIDE 50 MG/ML
50 INJECTION INTRAMUSCULAR; INTRAVENOUS PRN
Status: CANCELLED | OUTPATIENT
Start: 2023-09-21

## 2023-09-20 RX ORDER — HEPARIN SODIUM (PORCINE) LOCK FLUSH IV SOLN 100 UNIT/ML 100 UNIT/ML
500 SOLUTION INTRAVENOUS PRN
Status: CANCELLED | OUTPATIENT
Start: 2023-09-21

## 2023-09-20 RX ORDER — PROCHLORPERAZINE MALEATE 10 MG
10 TABLET ORAL EVERY 6 HOURS PRN
Status: CANCELLED | OUTPATIENT
Start: 2023-09-21

## 2023-09-20 RX ORDER — 0.9 % SODIUM CHLORIDE 0.9 %
3 VIAL (ML) INJECTION PRN
Status: CANCELLED | OUTPATIENT
Start: 2023-09-21

## 2023-09-20 RX ORDER — 0.9 % SODIUM CHLORIDE 0.9 %
10 VIAL (ML) INJECTION PRN
Status: CANCELLED | OUTPATIENT
Start: 2023-09-20

## 2023-09-20 RX ORDER — HEPARIN SODIUM (PORCINE) LOCK FLUSH IV SOLN 100 UNIT/ML 100 UNIT/ML
500 SOLUTION INTRAVENOUS PRN
Status: CANCELLED | OUTPATIENT
Start: 2023-09-20

## 2023-09-20 RX ORDER — METHYLPREDNISOLONE SODIUM SUCCINATE 125 MG/2ML
125 INJECTION, POWDER, LYOPHILIZED, FOR SOLUTION INTRAMUSCULAR; INTRAVENOUS PRN
Status: CANCELLED | OUTPATIENT
Start: 2023-09-21

## 2023-09-20 RX ORDER — EPINEPHRINE 1 MG/ML(1)
0.5 AMPUL (ML) INJECTION PRN
Status: CANCELLED | OUTPATIENT
Start: 2023-09-21

## 2023-09-20 RX ORDER — 0.9 % SODIUM CHLORIDE 0.9 %
VIAL (ML) INJECTION PRN
Status: CANCELLED | OUTPATIENT
Start: 2023-09-21

## 2023-09-20 RX ORDER — ONDANSETRON 2 MG/ML
4 INJECTION INTRAMUSCULAR; INTRAVENOUS PRN
Status: CANCELLED | OUTPATIENT
Start: 2023-09-21

## 2023-09-20 RX ORDER — SODIUM CHLORIDE 9 MG/ML
INJECTION, SOLUTION INTRAVENOUS CONTINUOUS
Status: CANCELLED | OUTPATIENT
Start: 2023-09-21

## 2023-09-20 RX ORDER — 0.9 % SODIUM CHLORIDE 0.9 %
3 VIAL (ML) INJECTION PRN
Status: CANCELLED | OUTPATIENT
Start: 2023-09-20

## 2023-09-20 RX ORDER — 0.9 % SODIUM CHLORIDE 0.9 %
VIAL (ML) INJECTION PRN
Status: CANCELLED | OUTPATIENT
Start: 2023-09-20

## 2023-09-20 NOTE — PROGRESS NOTES
"Pharmacy Chemotherapy Verification    Dx: Renal cell carcinoma    Cycle 7 - delayed for illness  Previous treatment = C6 on 7/13/23    Regimen and Dosing Reference  Nivolumab 3 mg/kg IV over 30 minutes on Day 1 followed by   Ipilimumab 1 mg/kg IV over 30 minutes on Day 1  21 day cycle for 4 cycles followed by   Competed 6/15/23  Nivlumab 480 mg IV over 30 minutes on Day 1   28 day cycle until disease progression or unacceptable toxicity  NCCN Guidelines for Kidney Cancer.V.3.2023  Issac FRAIRE, et al. J Clin Oncol. 2017;35(34):1677-3338  Nette TRAN, et al. Lancet Oncol. 2019;20(10):4003-3127  Atkins MB, et al. J Clin Oncol. 2021;39(15_suppl);4510    Allergies:Patient has no known allergies.  BP (!) 146/75   Pulse 85   Temp 36.7 °C (98 °F) (Temporal)   Resp 18   Ht 1.735 m (5' 8.31\") Comment: Shoes off.  Wt 86.4 kg (190 lb 7.6 oz)   SpO2 98%   BMI 28.70 kg/m²   Body surface area is 2.04 meters squared.    All labs (9/21/23) and thyroid panel within treatment plan parameters, except: SCr 2.1  **MD aware of current labs and okay given to proceed with treatment today.      Nivolumab (Opdivo) 480 mg fixed dose     No calculation required, okay to treat with final dose = 480 mg IV      Marsha Soto, PharmD  "

## 2023-09-21 ENCOUNTER — OUTPATIENT INFUSION SERVICES (OUTPATIENT)
Dept: ONCOLOGY | Facility: MEDICAL CENTER | Age: 68
End: 2023-09-21
Attending: INTERNAL MEDICINE
Payer: MEDICARE

## 2023-09-21 VITALS
BODY MASS INDEX: 28.87 KG/M2 | HEART RATE: 85 BPM | RESPIRATION RATE: 18 BRPM | SYSTOLIC BLOOD PRESSURE: 146 MMHG | WEIGHT: 190.48 LBS | TEMPERATURE: 98 F | HEIGHT: 68 IN | DIASTOLIC BLOOD PRESSURE: 75 MMHG | OXYGEN SATURATION: 98 %

## 2023-09-21 DIAGNOSIS — C64.9 RENAL CELL CARCINOMA, UNSPECIFIED LATERALITY (HCC): ICD-10-CM

## 2023-09-21 LAB
ALBUMIN SERPL BCP-MCNC: 4.4 G/DL (ref 3.2–4.9)
ALBUMIN/GLOB SERPL: 1.4 G/DL
ALP SERPL-CCNC: 104 U/L (ref 30–99)
ALT SERPL-CCNC: 11 U/L (ref 2–50)
ANION GAP SERPL CALC-SCNC: 11 MMOL/L (ref 7–16)
AST SERPL-CCNC: 16 U/L (ref 12–45)
BASOPHILS # BLD AUTO: 0.7 % (ref 0–1.8)
BASOPHILS # BLD: 0.09 K/UL (ref 0–0.12)
BILIRUB SERPL-MCNC: 0.5 MG/DL (ref 0.1–1.5)
BUN SERPL-MCNC: 28 MG/DL (ref 8–22)
CALCIUM ALBUM COR SERPL-MCNC: 9.2 MG/DL (ref 8.5–10.5)
CALCIUM SERPL-MCNC: 9.5 MG/DL (ref 8.5–10.5)
CHLORIDE SERPL-SCNC: 99 MMOL/L (ref 96–112)
CO2 SERPL-SCNC: 25 MMOL/L (ref 20–33)
CORTIS SERPL-MCNC: 7.2 UG/DL (ref 0–23)
CREAT SERPL-MCNC: 2.1 MG/DL (ref 0.5–1.4)
EOSINOPHIL # BLD AUTO: 0.29 K/UL (ref 0–0.51)
EOSINOPHIL NFR BLD: 2.2 % (ref 0–6.9)
ERYTHROCYTE [DISTWIDTH] IN BLOOD BY AUTOMATED COUNT: 48.2 FL (ref 35.9–50)
GFR SERPLBLD CREATININE-BSD FMLA CKD-EPI: 33 ML/MIN/1.73 M 2
GLOBULIN SER CALC-MCNC: 3.2 G/DL (ref 1.9–3.5)
GLUCOSE SERPL-MCNC: 88 MG/DL (ref 65–99)
HCT VFR BLD AUTO: 39.8 % (ref 42–52)
HGB BLD-MCNC: 12.9 G/DL (ref 14–18)
IMM GRANULOCYTES # BLD AUTO: 0.06 K/UL (ref 0–0.11)
IMM GRANULOCYTES NFR BLD AUTO: 0.5 % (ref 0–0.9)
LYMPHOCYTES # BLD AUTO: 3.77 K/UL (ref 1–4.8)
LYMPHOCYTES NFR BLD: 29.2 % (ref 22–41)
MCH RBC QN AUTO: 28.4 PG (ref 27–33)
MCHC RBC AUTO-ENTMCNC: 32.4 G/DL (ref 32.3–36.5)
MCV RBC AUTO: 87.7 FL (ref 81.4–97.8)
MONOCYTES # BLD AUTO: 1.45 K/UL (ref 0–0.85)
MONOCYTES NFR BLD AUTO: 11.2 % (ref 0–13.4)
NEUTROPHILS # BLD AUTO: 7.26 K/UL (ref 1.82–7.42)
NEUTROPHILS NFR BLD: 56.2 % (ref 44–72)
NRBC # BLD AUTO: 0 K/UL
NRBC BLD-RTO: 0 /100 WBC (ref 0–0.2)
OUTPT INFUS CBC COMMENT OICOM: ABNORMAL
PLATELET # BLD AUTO: 388 K/UL (ref 164–446)
PMV BLD AUTO: 9.9 FL (ref 9–12.9)
POTASSIUM SERPL-SCNC: 4.1 MMOL/L (ref 3.6–5.5)
PROT SERPL-MCNC: 7.6 G/DL (ref 6–8.2)
RBC # BLD AUTO: 4.54 M/UL (ref 4.7–6.1)
SODIUM SERPL-SCNC: 135 MMOL/L (ref 135–145)
T4 FREE SERPL-MCNC: 1.05 NG/DL (ref 0.93–1.7)
TSH SERPL DL<=0.005 MIU/L-ACNC: 3.37 UIU/ML (ref 0.38–5.33)
WBC # BLD AUTO: 12.9 K/UL (ref 4.8–10.8)

## 2023-09-21 PROCEDURE — 80053 COMPREHEN METABOLIC PANEL: CPT

## 2023-09-21 PROCEDURE — 84439 ASSAY OF FREE THYROXINE: CPT

## 2023-09-21 PROCEDURE — 85025 COMPLETE CBC W/AUTO DIFF WBC: CPT

## 2023-09-21 PROCEDURE — 84443 ASSAY THYROID STIM HORMONE: CPT

## 2023-09-21 PROCEDURE — 82533 TOTAL CORTISOL: CPT

## 2023-09-21 PROCEDURE — 700111 HCHG RX REV CODE 636 W/ 250 OVERRIDE (IP): Mod: JZ,JG | Performed by: INTERNAL MEDICINE

## 2023-09-21 PROCEDURE — 96360 HYDRATION IV INFUSION INIT: CPT

## 2023-09-21 PROCEDURE — 96413 CHEMO IV INFUSION 1 HR: CPT

## 2023-09-21 PROCEDURE — 96361 HYDRATE IV INFUSION ADD-ON: CPT

## 2023-09-21 PROCEDURE — 700105 HCHG RX REV CODE 258: Performed by: INTERNAL MEDICINE

## 2023-09-21 RX ORDER — SODIUM CHLORIDE 9 MG/ML
1000 INJECTION, SOLUTION INTRAVENOUS ONCE
Status: COMPLETED | OUTPATIENT
Start: 2023-09-21 | End: 2023-09-21

## 2023-09-21 RX ADMIN — SODIUM CHLORIDE 1000 ML: 9 INJECTION, SOLUTION INTRAVENOUS at 16:52

## 2023-09-21 RX ADMIN — SODIUM CHLORIDE 480 MG: 9 INJECTION, SOLUTION INTRAVENOUS at 17:24

## 2023-09-21 ASSESSMENT — FIBROSIS 4 INDEX: FIB4 SCORE: 0.86

## 2023-09-21 ASSESSMENT — PAIN DESCRIPTION - PAIN TYPE: TYPE: ACUTE PAIN

## 2023-09-21 NOTE — PROGRESS NOTES
"Pharmacy Chemotherapy Calculation:    Dx: renal cell carcinoma         Protocol: Nivolumab + Ipilimumab followed by Nivolumab     *Dosing Reference*  Nivolumab 3 mg/kg IV over 30 min on Day 1 followed by  Ipilimumab 1 mg/kg IV over 30 min on Day 1   21-day cycle for 4 cycles - finished 5/25/23  ~followed by~  Nivolumab 240 or 480 mg IV over 30 min on Day 1  14- or 28-day cycle until disease progression or unacceptable toxicity  NCCN Guidelines for Kidney Cancer V.3.2023  Issac FRAIRE, et al. J Clin Oncol. 2017;35(34):1641-7107    Allergies:  Patient has no known allergies.     BP (!) 146/75   Pulse 85   Temp 36.7 °C (98 °F) (Temporal)   Resp 18   Ht 1.735 m (5' 8.31\") Comment: Shoes off.  Wt 86.4 kg (190 lb 7.6 oz)   SpO2 98%   BMI 28.70 kg/m²  Body surface area is 2.04 meters squared.    Labs 9/21/23  ANC~ 7260 Plt = 388k   Hgb = 12.9     SCr = 2.1 mg/dL CrCl ~ 40.5 mL/min   LFT's = 16/11/104 TBili = 0.5   TSH = 3.37 Free T4 = 1.05    Drug Order   (Drug name, dose, route, IV Fluid & volume, frequency, number of doses) Cycle 7  Previous treatment: C6 on 7/13/23     Medication = Nivolumab (Opdivo)  Base Dose = 480 mg   Calc Dose: N/A; fixed dose = 480 mg  Final Dose = 480 mg  Route = IV  Fluid & Volume =  mL  Admin Duration = Over 30 minutes          Fixed dose, ok to treat with final dose.    By my signature below, I confirm this process was performed independently with the BSA and all final chemotherapy dosing calculations congruent. I have reviewed the above chemotherapy order and that my calculation of the final dose and BSA (when applicable) corroborate those calculations of the  pharmacist. Discrepancies of 10% or greater in the written dose have been addressed and documented within the [facility] Progress notes.    Gary Rodriguez, PharmD,  "

## 2023-09-21 NOTE — PROGRESS NOTES
Chemotherapy Verification - PRIMARY RN      Height = 1.735m  Weight = 86.4kg  BSA = 2.04m2       Medication: nivolumab (Opdivo)  Dose: 480mg set dose  Calculated Dose: 480mg (set dose)                                     I confirm this process was performed independently with the BSA and all final chemotherapy dosing calculations congruent.  Any discrepancies of 10% or greater have been addressed with the chemotherapy pharmacist. The resolution of the discrepancy has been documented in the EPIC progress notes.

## 2023-09-21 NOTE — PROGRESS NOTES
Chemotherapy Verification - SECONDARY RN       Height = 1.735m  Weight = 86.4kg  BSA = 2.04m2       Medication: nivolumab  Dose: flat dose  Calculated Dose: 480mg                             (In mg/m2, AUC, mg/kg)       I confirm that this process was performed independently.

## 2023-09-22 NOTE — PROGRESS NOTES
Patient came into INF with daughter. Orders and vitals reviewed, assessment done. PIV started with blood return noted. Labs collected and reviewed, patient meets parameters to proceed with treatment today. Opdivo and hydration treatment given as ordered with no adverse events. Piv flushed and removed with tip intact. Pt left in stable condition with next appointment confirmed.

## 2023-10-02 ENCOUNTER — HOSPITAL ENCOUNTER (OUTPATIENT)
Dept: RADIOLOGY | Facility: MEDICAL CENTER | Age: 68
End: 2023-10-02
Attending: INTERNAL MEDICINE
Payer: MEDICARE

## 2023-10-02 DIAGNOSIS — C80.1 MALIGNANT NEOPLASTIC DISEASE (HCC): ICD-10-CM

## 2023-10-02 DIAGNOSIS — C64.1 MALIGNANT NEOPLASM OF RIGHT KIDNEY, EXCEPT RENAL PELVIS (HCC): ICD-10-CM

## 2023-10-02 PROCEDURE — A9503 TC99M MEDRONATE: HCPCS

## 2023-10-19 ENCOUNTER — APPOINTMENT (OUTPATIENT)
Dept: ONCOLOGY | Facility: MEDICAL CENTER | Age: 68
End: 2023-10-19
Attending: INTERNAL MEDICINE
Payer: MEDICARE

## 2025-01-29 ENCOUNTER — HOSPITAL ENCOUNTER (OUTPATIENT)
Facility: MEDICAL CENTER | Age: 70
End: 2025-01-29
Attending: PHYSICIAN ASSISTANT
Payer: MEDICARE

## 2025-01-29 ENCOUNTER — OFFICE VISIT (OUTPATIENT)
Dept: URGENT CARE | Facility: CLINIC | Age: 70
End: 2025-01-29
Payer: MEDICARE

## 2025-01-29 VITALS
DIASTOLIC BLOOD PRESSURE: 80 MMHG | BODY MASS INDEX: 24.77 KG/M2 | OXYGEN SATURATION: 96 % | RESPIRATION RATE: 16 BRPM | TEMPERATURE: 97.6 F | HEART RATE: 83 BPM | HEIGHT: 70 IN | SYSTOLIC BLOOD PRESSURE: 156 MMHG | WEIGHT: 173 LBS

## 2025-01-29 DIAGNOSIS — B37.0 ORAL THRUSH: ICD-10-CM

## 2025-01-29 DIAGNOSIS — J02.9 PHARYNGITIS, UNSPECIFIED ETIOLOGY: ICD-10-CM

## 2025-01-29 DIAGNOSIS — D84.9 IMMUNOSUPPRESSION (HCC): ICD-10-CM

## 2025-01-29 LAB — S PYO DNA SPEC NAA+PROBE: NOT DETECTED

## 2025-01-29 PROCEDURE — 99214 OFFICE O/P EST MOD 30 MIN: CPT | Performed by: PHYSICIAN ASSISTANT

## 2025-01-29 PROCEDURE — 87070 CULTURE OTHR SPECIMN AEROBIC: CPT

## 2025-01-29 PROCEDURE — 87651 STREP A DNA AMP PROBE: CPT | Performed by: PHYSICIAN ASSISTANT

## 2025-01-29 RX ORDER — CLOTRIMAZOLE 10 MG/1
10 LOZENGE ORAL
Qty: 35 TROCHE | Refills: 1 | Status: SHIPPED | OUTPATIENT
Start: 2025-01-29

## 2025-01-29 RX ORDER — DEXAMETHASONE SODIUM PHOSPHATE 10 MG/ML
10 INJECTION INTRAMUSCULAR; INTRAVENOUS ONCE
Status: COMPLETED | OUTPATIENT
Start: 2025-01-29 | End: 2025-01-29

## 2025-01-29 RX ADMIN — DEXAMETHASONE SODIUM PHOSPHATE 10 MG: 10 INJECTION INTRAMUSCULAR; INTRAVENOUS at 13:12

## 2025-01-29 ASSESSMENT — ENCOUNTER SYMPTOMS
EYE PAIN: 0
VOMITING: 0
NAUSEA: 0
COUGH: 0
MYALGIAS: 0
HEADACHES: 0
FEVER: 0
ABDOMINAL PAIN: 0
SORE THROAT: 1
SHORTNESS OF BREATH: 0
DIARRHEA: 0
DIAPHORESIS: 1
CHILLS: 0
CONSTIPATION: 0

## 2025-01-29 ASSESSMENT — FIBROSIS 4 INDEX: FIB4 SCORE: 0.87

## 2025-01-29 NOTE — PROGRESS NOTES
"Subjective:   Polo Johnson is a 69 y.o. male who presents for Sore Throat (Sweaty x 3 days)      This is a pleasant 69-year-old male who is stage IV cancer, he has had malaise and fatigue felt hot and cold and had a mildly scratchy not quite sore throat that is mildly irritated when swallowing for the last several days.  He denies any overt fevers, congestion or coughing    Review of Systems   Constitutional:  Positive for diaphoresis. Negative for chills and fever.   HENT:  Positive for sore throat. Negative for congestion and ear pain.    Eyes:  Negative for pain.   Respiratory:  Negative for cough and shortness of breath.    Cardiovascular:  Negative for chest pain.   Gastrointestinal:  Negative for abdominal pain, constipation, diarrhea, nausea and vomiting.   Genitourinary:  Negative for dysuria.   Musculoskeletal:  Negative for myalgias.   Skin:  Negative for rash.   Neurological:  Negative for headaches.       Medications, Allergies, and current problem list reviewed today in Epic.     Objective:     BP (!) 156/80 (BP Location: Left arm, Patient Position: Sitting, BP Cuff Size: Adult)   Pulse 83   Temp 36.4 °C (97.6 °F) (Temporal)   Resp 16   Ht 1.778 m (5' 10\")   Wt 78.5 kg (173 lb)   SpO2 96%     Physical Exam  Vitals reviewed.   Constitutional:       Appearance: Normal appearance.   HENT:      Head: Normocephalic and atraumatic.      Right Ear: External ear normal.      Left Ear: External ear normal.      Nose: Nose normal.      Mouth/Throat:      Mouth: Mucous membranes are moist.      Comments: Mild erythema of the palatine arch with white scrappable plaque on tongue  Eyes:      Conjunctiva/sclera: Conjunctivae normal.   Cardiovascular:      Rate and Rhythm: Normal rate and regular rhythm.   Pulmonary:      Effort: Pulmonary effort is normal.      Breath sounds: Normal breath sounds.   Lymphadenopathy:      Cervical: No cervical adenopathy.   Skin:     General: Skin is warm and dry.      " Capillary Refill: Capillary refill takes less than 2 seconds.   Neurological:      Mental Status: He is alert and oriented to person, place, and time.         Assessment/Plan:     Diagnosis and associated orders:     1. Pharyngitis, unspecified etiology  POCT CEPHEID GROUP A STREP - PCR    dexamethasone (Decadron) injection (check route below) 10 mg    CULTURE THROAT      2. Oral thrush  clotrimazole (MYCELEX) 10 MG Diamond diamond    CULTURE THROAT      3. Immunosuppression (HCC)           Comments/MDM:   Strep testing negative although patient has very obvious oral thrush consistent with his immune compromised status.  Reviewed use of clotrimazole crochets keeping his immunocompromise status will send out throat culture but likely all symptoms are manifestation of the oral thrush as well as ongoing oncology and malignancy issues.  No evidence of deep space infection such as peritonsillar abscess.  Follow-up as needed.         Differential diagnosis, natural history, supportive care, and indications for immediate follow-up discussed.    Advised the patient to follow-up with the primary care physician for recheck, reevaluation, and consideration of further management.    Please note that this dictation was created using voice recognition software. I have made a reasonable attempt to correct obvious errors, but I expect that there are errors of grammar and possibly content that I did not discover before finalizing the note.    This note was electronically signed by Renaldo Waddell PA-C

## 2025-01-31 LAB
BACTERIA SPEC RESP CULT: NORMAL
SIGNIFICANT IND 70042: NORMAL
SITE SITE: NORMAL
SOURCE SOURCE: NORMAL

## 2025-02-03 ENCOUNTER — APPOINTMENT (OUTPATIENT)
Dept: RADIOLOGY | Facility: IMAGING CENTER | Age: 70
End: 2025-02-03
Attending: FAMILY MEDICINE
Payer: MEDICARE

## 2025-02-03 ENCOUNTER — OFFICE VISIT (OUTPATIENT)
Dept: URGENT CARE | Facility: CLINIC | Age: 70
End: 2025-02-03
Payer: MEDICARE

## 2025-02-03 VITALS
BODY MASS INDEX: 24.91 KG/M2 | WEIGHT: 174 LBS | RESPIRATION RATE: 18 BRPM | HEIGHT: 70 IN | SYSTOLIC BLOOD PRESSURE: 156 MMHG | TEMPERATURE: 97.5 F | OXYGEN SATURATION: 95 % | HEART RATE: 84 BPM | DIASTOLIC BLOOD PRESSURE: 96 MMHG

## 2025-02-03 DIAGNOSIS — Z85.528 HISTORY OF RENAL CELL CARCINOMA: ICD-10-CM

## 2025-02-03 DIAGNOSIS — R05.1 ACUTE COUGH: ICD-10-CM

## 2025-02-03 DIAGNOSIS — R06.2 WHEEZE: ICD-10-CM

## 2025-02-03 DIAGNOSIS — Z87.448 HISTORY OF RENAL INSUFFICIENCY: ICD-10-CM

## 2025-02-03 DIAGNOSIS — J18.9 PNEUMONIA OF LEFT LUNG DUE TO INFECTIOUS ORGANISM, UNSPECIFIED PART OF LUNG: ICD-10-CM

## 2025-02-03 PROCEDURE — 71046 X-RAY EXAM CHEST 2 VIEWS: CPT | Mod: TC | Performed by: FAMILY MEDICINE

## 2025-02-03 PROCEDURE — 99214 OFFICE O/P EST MOD 30 MIN: CPT | Performed by: FAMILY MEDICINE

## 2025-02-03 PROCEDURE — 3080F DIAST BP >= 90 MM HG: CPT | Performed by: FAMILY MEDICINE

## 2025-02-03 PROCEDURE — 3077F SYST BP >= 140 MM HG: CPT | Performed by: FAMILY MEDICINE

## 2025-02-03 RX ORDER — AZITHROMYCIN 250 MG/1
TABLET, FILM COATED ORAL
Qty: 6 TABLET | Refills: 0 | Status: SHIPPED | OUTPATIENT
Start: 2025-02-03

## 2025-02-03 RX ORDER — DEXTROMETHORPHAN HYDROBROMIDE AND PROMETHAZINE HYDROCHLORIDE 15; 6.25 MG/5ML; MG/5ML
5 SYRUP ORAL 4 TIMES DAILY PRN
Qty: 120 ML | Refills: 0 | Status: SHIPPED | OUTPATIENT
Start: 2025-02-03

## 2025-02-03 RX ORDER — ALBUTEROL SULFATE 90 UG/1
2 INHALANT RESPIRATORY (INHALATION) EVERY 4 HOURS PRN
Qty: 1 EACH | Refills: 0 | Status: SHIPPED | OUTPATIENT
Start: 2025-02-03

## 2025-02-03 ASSESSMENT — FIBROSIS 4 INDEX: FIB4 SCORE: 0.87

## 2025-02-03 ASSESSMENT — ENCOUNTER SYMPTOMS
WEIGHT LOSS: 0
NAUSEA: 0
EYE DISCHARGE: 0
EYE REDNESS: 0
VOMITING: 0
MYALGIAS: 0

## 2025-02-03 NOTE — PROGRESS NOTES
"Subjective     Polo Johnson is a 69 y.o. male who presents with Cough (X5days, cough, colored mucus, congestion, runny nose, sore throat)            5 days productive cough with blood in sputum. Subjective fever/chills. Sinus pressure and drainage. No SOB. +wheeze. PMH pneumonia requiring hospitalization. PMH stage 4 renal cell carcinoma. S/P right nephrectomy. Notes that he is immune compromised due to cancer therapy and that his oncologist would like him on antibiotic. No PMH asthma. No other aggravating or alleviating factors.          Review of Systems   Constitutional:  Positive for malaise/fatigue. Negative for weight loss.   Eyes:  Negative for discharge and redness.   Gastrointestinal:  Negative for nausea and vomiting.   Musculoskeletal:  Negative for joint pain and myalgias.   Skin:  Negative for itching and rash.              Objective     BP (!) 156/96 (BP Location: Left arm, Patient Position: Sitting, BP Cuff Size: Adult)   Pulse 84   Temp 36.4 °C (97.5 °F) (Temporal)   Resp 18   Ht 1.778 m (5' 10\")   Wt 78.9 kg (174 lb)   SpO2 95%   BMI 24.97 kg/m²      Physical Exam  Constitutional:       General: He is not in acute distress.     Appearance: He is well-developed.   HENT:      Head: Normocephalic and atraumatic.      Right Ear: Tympanic membrane normal.      Left Ear: Tympanic membrane normal.      Nose: Congestion present.      Mouth/Throat:      Comments: PND  Eyes:      Conjunctiva/sclera: Conjunctivae normal.   Cardiovascular:      Rate and Rhythm: Normal rate and regular rhythm.      Heart sounds: Normal heart sounds. No murmur heard.  Pulmonary:      Effort: Pulmonary effort is normal.      Breath sounds: Wheezing present.   Skin:     General: Skin is warm and dry.      Findings: No rash.   Neurological:      Mental Status: He is alert.                             Assessment & Plan   CXR per radiology  1.  Ill-defined parenchymal opacity within the left mid lung posteriorly likely " within the superior segment left lower lobe. Differential diagnosis includes pneumonia as well as underlying mass or scarring. If the patient has clinical symptoms of   pneumonia, treatment a follow-up radiograph in 4-6 weeks is recommended.     2.  Again seen deformity of the left posterior seventh rib consistent with the patient's known lytic expansile lesion in that area.    Cr 9/21/2024 2.10  Calcuated Cr clearance 34: no dose adjustments necessary      1. Acute cough  DX-CHEST-2 VIEWS    promethazine-dextromethorphan (PROMETHAZINE-DM) 6.25-15 MG/5ML syrup      2. Wheeze  DX-CHEST-2 VIEWS    albuterol 108 (90 Base) MCG/ACT Aero Soln inhalation aerosol      3. History of renal cell carcinoma        4. Pneumonia of left lung due to infectious organism, unspecified part of lung  amoxicillin-clavulanate (AUGMENTIN) 875-125 MG Tab    azithromycin (ZITHROMAX) 250 MG Tab      5. History of renal insufficiency          Differential diagnosis, natural history, supportive care, and indications for immediate follow-up were discussed.     Polo is a high risk patient with recurrent pneumonia on top of stage IV cancer, immunocompromise, and renal insufficiency.  He would like to avoid hospitalization however he understands and go to the emergency department with any worsening of symptoms.  Otherwise recommend follow-up with primary care and his oncologist.

## 2025-06-21 ENCOUNTER — OFFICE VISIT (OUTPATIENT)
Dept: URGENT CARE | Facility: CLINIC | Age: 70
End: 2025-06-21
Payer: MEDICARE

## 2025-06-21 VITALS
WEIGHT: 165 LBS | HEIGHT: 70 IN | TEMPERATURE: 97.6 F | BODY MASS INDEX: 23.62 KG/M2 | DIASTOLIC BLOOD PRESSURE: 82 MMHG | SYSTOLIC BLOOD PRESSURE: 146 MMHG | HEART RATE: 80 BPM | OXYGEN SATURATION: 93 %

## 2025-06-21 DIAGNOSIS — S46.911A STRAIN OF RIGHT SHOULDER, INITIAL ENCOUNTER: Primary | ICD-10-CM

## 2025-06-21 PROCEDURE — 3077F SYST BP >= 140 MM HG: CPT | Performed by: NURSE PRACTITIONER

## 2025-06-21 PROCEDURE — 99213 OFFICE O/P EST LOW 20 MIN: CPT | Performed by: NURSE PRACTITIONER

## 2025-06-21 PROCEDURE — 3079F DIAST BP 80-89 MM HG: CPT | Performed by: NURSE PRACTITIONER

## 2025-06-21 RX ORDER — PREDNISONE 20 MG/1
TABLET ORAL
Qty: 14 TABLET | Refills: 0 | Status: SHIPPED | OUTPATIENT
Start: 2025-06-21

## 2025-06-21 ASSESSMENT — ENCOUNTER SYMPTOMS
FEVER: 0
TINGLING: 0
CHILLS: 0
SENSORY CHANGE: 0
MYALGIAS: 1
FOCAL WEAKNESS: 0

## 2025-06-21 ASSESSMENT — FIBROSIS 4 INDEX: FIB4 SCORE: 0.87

## 2025-06-21 NOTE — PROGRESS NOTES
Subjective     Polo Johnson is a 69 y.o. male who presents with Arm Pain (Right arm, started hurting 4 days ago. /Carried cases of water bottles, isn't sure if he strained his arm )            HPI  New problem.  Patient is a very pleasant 69-year-old male with history of stage IV cancer who comes in with right shoulder/right upper arm pain for the past 4 days.  He states he may have strained this carrying cases of bottled water upstairs.  He denies any distal paresthesia or focal weakness but states the pain can be quite severe at times.  It seems to start at the shoulder/proximal upper arm and radiate down.  He has taken Tylenol arthritis as well as done ice and heat for this.  He is right-hand dominant.    Patient has no known allergies.  Medications Ordered Prior to Encounter[1]  Social History     Socioeconomic History    Marital status: Single     Spouse name: Not on file    Number of children: Not on file    Years of education: Not on file    Highest education level: Not on file   Occupational History    Not on file   Tobacco Use    Smoking status: Never    Smokeless tobacco: Never   Vaping Use    Vaping status: Never Used   Substance and Sexual Activity    Alcohol use: No    Drug use: No    Sexual activity: Not on file   Other Topics Concern    Not on file   Social History Narrative    Not on file     Social Drivers of Health     Financial Resource Strain: Not on file   Food Insecurity: Not on file   Transportation Needs: Not on file   Physical Activity: Not on file   Stress: Not on file   Social Connections: Not on file   Intimate Partner Violence: Not on file   Housing Stability: Not on file     Breast Cancer-related family history is not on file.      Review of Systems   Constitutional:  Negative for chills and fever.   Musculoskeletal:  Positive for joint pain and myalgias.   Neurological:  Negative for tingling, sensory change and focal weakness.   All other systems reviewed and are  "negative.             Objective     BP (!) 146/82 (BP Location: Left arm, Patient Position: Sitting, BP Cuff Size: Adult)   Pulse 80   Temp 36.4 °C (97.6 °F) (Temporal)   Ht 1.778 m (5' 10\")   Wt 74.8 kg (165 lb)   SpO2 93%   BMI 23.68 kg/m²      Physical Exam  Vitals and nursing note reviewed.   Constitutional:       Appearance: Normal appearance.   Cardiovascular:      Rate and Rhythm: Normal rate and regular rhythm.      Heart sounds: No murmur heard.  Pulmonary:      Effort: Pulmonary effort is normal.      Breath sounds: Normal breath sounds.   Musculoskeletal:      Right shoulder: Bony tenderness present. No crepitus. Decreased range of motion. Normal strength.        Arms:    Skin:     General: Skin is warm and dry.      Findings: No bruising.   Neurological:      General: No focal deficit present.      Mental Status: He is alert and oriented to person, place, and time.      Motor: No weakness.                                  Assessment & Plan  Strain of right shoulder, initial encounter    Orders:    predniSONE (DELTASONE) 20 MG Tab; Take 2 tabs daily with food.       Patient advised no NSAIDs while on prednisone.  He may continue taking Tylenol arthritis and using ice and heat for the symptoms.  Advised to continue his exercises that he learned when he had shoulder tendinitis previously.  He has to follow-up if symptoms are not improved at the conclusion of his steroids.  He is verbalized understanding of these instructions.               [1]   Current Outpatient Medications on File Prior to Visit   Medication Sig Dispense Refill    promethazine-dextromethorphan (PROMETHAZINE-DM) 6.25-15 MG/5ML syrup Take 5 mL by mouth 4 times a day as needed for Cough. 120 mL 0    albuterol 108 (90 Base) MCG/ACT Aero Soln inhalation aerosol Inhale 2 Puffs every four hours as needed for Shortness of Breath. 1 Each 0    clotrimazole (MYCELEX) 10 MG Diamond diamond Take 1 Diamond by mouth 5 Times a Day. 35 Diamond 1    " ondansetron (ZOFRAN ODT) 8 MG TABLET DISPERSIBLE DISSOLVE 1 TABLET IN MOUTH EVERY 12 HOURS AS NEEDED FOR NAUSEA      Cetirizine HCl (ZYRTEC PO) Take  by mouth.      triamcinolone acetonide (KENALOG) 0.1 % Cream Apply 1 Application topically 1 time a day as needed.      HYDROcodone-acetaminophen (NORCO) 5-325 MG Tab per tablet Take 1 Tablet by mouth 3 times a day as needed. for pain      diphenhydrAMINE (BENADRYL) 25 MG Tab Take 25 mg by mouth at bedtime as needed for Sleep.      acetaminophen (TYLENOL) 500 MG Tab Take 500-1,000 mg by mouth 2 times a day as needed. Indications: Pain      busPIRone (BUSPAR) 7.5 MG tablet Take 15 mg by mouth 2 times a day.      Levocetirizine Dihydrochloride (XYZAL ALLERGY 24HR PO) Take 1 Tablet by mouth at bedtime as needed.      omeprazole (PRILOSEC) 20 MG delayed-release capsule Take 20 mg by mouth every day.      Oxymetazoline HCl (AFRIN NASAL SPRAY NA) Administer 1 Spray into affected nostril(S) 1 time a day as needed.      azithromycin (ZITHROMAX) 250 MG Tab Take 2 tablets (500 mg) PO on day 1 and then take 1 tablet (250 mg) daily on 2-5 6 Tablet 0     No current facility-administered medications on file prior to visit.